# Patient Record
Sex: FEMALE | Race: WHITE | NOT HISPANIC OR LATINO | Employment: FULL TIME | URBAN - METROPOLITAN AREA
[De-identification: names, ages, dates, MRNs, and addresses within clinical notes are randomized per-mention and may not be internally consistent; named-entity substitution may affect disease eponyms.]

---

## 2018-04-10 LAB
CHOLESTEROL, TOTAL: 186 MG/DL
CHOLESTEROL/HDL RATIO: NORMAL
GLUCOSE BLD-MCNC: 97 MG/DL
HDLC SERPL-MCNC: 62 MG/DL (ref 35–70)
LDL CHOLESTEROL CALCULATED: 99 MG/DL (ref 0–160)
NONHDLC SERPL-MCNC: 124 MG/DL
TRIGL SERPL-MCNC: 123 MG/DL
VLDLC SERPL CALC-MCNC: NORMAL MG/DL

## 2019-12-27 ENCOUNTER — APPOINTMENT (OUTPATIENT)
Dept: INTERPRETER SERVICES | Age: 54
End: 2019-12-27

## 2019-12-27 ENCOUNTER — WORKER'S COMP (OUTPATIENT)
Dept: URGENT CARE | Age: 54
End: 2019-12-27

## 2019-12-27 DIAGNOSIS — M62.838 TRAPEZIUS MUSCLE SPASM: ICD-10-CM

## 2019-12-27 DIAGNOSIS — G56.03 BILATERAL CARPAL TUNNEL SYNDROME: Primary | ICD-10-CM

## 2019-12-27 DIAGNOSIS — M62.830 SPASM OF MUSCLE OF LOWER BACK: ICD-10-CM

## 2019-12-27 PROCEDURE — 99203 OFFICE O/P NEW LOW 30 MIN: CPT | Performed by: FAMILY MEDICINE

## 2019-12-27 RX ORDER — CYCLOBENZAPRINE HCL 5 MG
2.5-1 TABLET ORAL 3 TIMES DAILY PRN
Qty: 30 TABLET | Refills: 5 | Status: SHIPPED | OUTPATIENT
Start: 2019-12-27

## 2019-12-27 RX ORDER — PREDNISONE 20 MG/1
TABLET ORAL
Qty: 14 TABLET | Refills: 0 | Status: SHIPPED | OUTPATIENT
Start: 2019-12-27

## 2019-12-27 ASSESSMENT — PAIN SCALES - GENERAL: PAINLEVEL: 7-8

## 2019-12-29 ENCOUNTER — MED INFO FORMS (OUTPATIENT)
Dept: URGENT CARE | Age: 54
End: 2019-12-29

## 2020-01-03 ENCOUNTER — WORKER'S COMP (OUTPATIENT)
Dept: OCCUPATIONAL MEDICINE | Age: 55
End: 2020-01-03
Attending: FAMILY MEDICINE

## 2020-01-03 VITALS
DIASTOLIC BLOOD PRESSURE: 83 MMHG | WEIGHT: 200 LBS | HEIGHT: 65 IN | HEART RATE: 72 BPM | BODY MASS INDEX: 33.32 KG/M2 | SYSTOLIC BLOOD PRESSURE: 121 MMHG

## 2020-01-03 DIAGNOSIS — S39.012A STRAIN OF LUMBAR REGION, INITIAL ENCOUNTER: Primary | ICD-10-CM

## 2020-01-03 DIAGNOSIS — R20.0 BILATERAL HAND NUMBNESS: ICD-10-CM

## 2020-01-03 PROCEDURE — 99214 OFFICE O/P EST MOD 30 MIN: CPT | Performed by: PREVENTIVE MEDICINE

## 2020-01-03 RX ORDER — NAPROXEN 500 MG/1
500 TABLET ORAL 2 TIMES DAILY WITH MEALS
Qty: 30 TABLET | Refills: 0 | Status: SHIPPED | OUTPATIENT
Start: 2020-01-03

## 2020-01-03 ASSESSMENT — ENCOUNTER SYMPTOMS
CHILLS: 0
ACTIVITY CHANGE: 0
COLOR CHANGE: 0
BACK PAIN: 1
BRUISES/BLEEDS EASILY: 0
FEVER: 0
SLEEP DISTURBANCE: 0
NERVOUS/ANXIOUS: 0
NUMBNESS: 1
WEAKNESS: 0

## 2020-01-06 ENCOUNTER — HOSPITAL ENCOUNTER (OUTPATIENT)
Dept: REHABILITATION | Age: 55
Discharge: STILL ADMITTED OR ADMINISTRATIVE | End: 2020-01-06
Attending: PREVENTIVE MEDICINE

## 2020-01-06 DIAGNOSIS — S39.012A STRAIN OF LUMBAR REGION, INITIAL ENCOUNTER: ICD-10-CM

## 2020-01-06 PROCEDURE — 97110 THERAPEUTIC EXERCISES: CPT | Performed by: PHYSICAL THERAPIST

## 2020-01-06 PROCEDURE — 97140 MANUAL THERAPY 1/> REGIONS: CPT | Performed by: PHYSICAL THERAPIST

## 2020-01-06 PROCEDURE — 10004173 HB COUNTER-THERAPY VISIT PT: Performed by: PHYSICAL THERAPIST

## 2020-01-06 PROCEDURE — 97161 PT EVAL LOW COMPLEX 20 MIN: CPT | Performed by: PHYSICAL THERAPIST

## 2020-01-06 ASSESSMENT — ENCOUNTER SYMPTOMS
ALLEVIATING FACTORS: HEAT
QUALITY: ACHE
ALLEVIATING FACTORS: PRESCRIPTION MEDICATIONS
QUALITY: THROBBING
PAIN FREQUENCY: CONSTANT
PAIN SEVERITY NOW: 1
PAIN SCALE AT HIGHEST: 8
ALLEVIATING FACTORS: ICE
SUBJECTIVE PAIN PROGRESSION: IMPROVED
PAIN SCALE AT LOWEST: 1

## 2020-01-08 ENCOUNTER — HOSPITAL ENCOUNTER (OUTPATIENT)
Dept: REHABILITATION | Age: 55
Discharge: STILL ADMITTED OR ADMINISTRATIVE | End: 2020-01-08
Attending: PREVENTIVE MEDICINE

## 2020-01-08 PROCEDURE — 97140 MANUAL THERAPY 1/> REGIONS: CPT | Performed by: PHYSICAL THERAPIST

## 2020-01-08 PROCEDURE — 10004173 HB COUNTER-THERAPY VISIT PT: Performed by: PHYSICAL THERAPIST

## 2020-01-08 PROCEDURE — 97110 THERAPEUTIC EXERCISES: CPT | Performed by: PHYSICAL THERAPIST

## 2020-01-08 ASSESSMENT — ENCOUNTER SYMPTOMS
ALLEVIATING FACTORS: PRESCRIPTION MEDICATIONS
PAIN FREQUENCY: CONSTANT
ALLEVIATING FACTORS: HEAT
PAIN SEVERITY NOW: 4
ALLEVIATING FACTORS: ICE

## 2020-01-13 ENCOUNTER — HOSPITAL ENCOUNTER (OUTPATIENT)
Dept: REHABILITATION | Age: 55
Discharge: STILL ADMITTED OR ADMINISTRATIVE | End: 2020-01-13
Attending: PREVENTIVE MEDICINE

## 2020-01-13 PROCEDURE — 10004173 HB COUNTER-THERAPY VISIT PT: Performed by: PHYSICAL THERAPIST

## 2020-01-13 PROCEDURE — 97110 THERAPEUTIC EXERCISES: CPT | Performed by: PHYSICAL THERAPIST

## 2020-01-13 PROCEDURE — 97140 MANUAL THERAPY 1/> REGIONS: CPT | Performed by: PHYSICAL THERAPIST

## 2020-01-13 ASSESSMENT — ENCOUNTER SYMPTOMS
QUALITY: ACHE
PAIN FREQUENCY: INTERMITTENT
ALLEVIATING FACTORS: HEAT
PAIN SCALE AT HIGHEST: 5
ALLEVIATING FACTORS: ICE
PAIN SCALE AT LOWEST: 2
ALLEVIATING FACTORS: PRESCRIPTION MEDICATIONS
PAIN SEVERITY NOW: 3

## 2020-01-15 ENCOUNTER — HOSPITAL ENCOUNTER (OUTPATIENT)
Dept: REHABILITATION | Age: 55
Discharge: STILL ADMITTED OR ADMINISTRATIVE | End: 2020-01-15
Attending: PREVENTIVE MEDICINE

## 2020-01-15 ENCOUNTER — HOSPITAL ENCOUNTER (OUTPATIENT)
Dept: REHABILITATION | Age: 55
Discharge: STILL ADMITTED OR ADMINISTRATIVE | End: 2020-01-15
Attending: FAMILY MEDICINE

## 2020-01-15 DIAGNOSIS — G56.03 BILATERAL CARPAL TUNNEL SYNDROME: ICD-10-CM

## 2020-01-15 DIAGNOSIS — M62.830 SPASM OF MUSCLE OF LOWER BACK: ICD-10-CM

## 2020-01-15 DIAGNOSIS — M62.838 TRAPEZIUS MUSCLE SPASM: ICD-10-CM

## 2020-01-15 PROCEDURE — 97530 THERAPEUTIC ACTIVITIES: CPT | Performed by: PHYSICAL THERAPIST

## 2020-01-15 PROCEDURE — 10004173 HB COUNTER-THERAPY VISIT PT: Performed by: PHYSICAL THERAPIST

## 2020-01-15 PROCEDURE — 97110 THERAPEUTIC EXERCISES: CPT | Performed by: PHYSICAL THERAPIST

## 2020-01-15 PROCEDURE — 10004172 HB COUNTER-THERAPY VISIT OT: Performed by: OCCUPATIONAL THERAPIST

## 2020-01-15 PROCEDURE — 97140 MANUAL THERAPY 1/> REGIONS: CPT | Performed by: PHYSICAL THERAPIST

## 2020-01-15 PROCEDURE — 97110 THERAPEUTIC EXERCISES: CPT | Performed by: OCCUPATIONAL THERAPIST

## 2020-01-15 PROCEDURE — 97165 OT EVAL LOW COMPLEX 30 MIN: CPT | Performed by: OCCUPATIONAL THERAPIST

## 2020-01-15 ASSESSMENT — ENCOUNTER SYMPTOMS
PAIN FREQUENCY: INTERMITTENT
ALLEVIATING FACTORS: PRESCRIPTION MEDICATIONS
ALLEVIATING FACTORS: HEAT
ALLEVIATING FACTORS: SUPPORTIVE DEVICE
PAIN SCALE AT LOWEST: 0
PAIN FREQUENCY: INTERMITTENT
ALLEVIATING FACTORS: PRESCRIPTION MEDICATIONS
QUALITY: SORE
PAIN SCALE AT HIGHEST: 8
ALLEVIATING FACTORS: OVER-THE-COUNTER MEDICATION
PAIN SCALE AT HIGHEST: 5
ALLEVIATING FACTORS: ICE
ALLEVIATING FACTORS: REST
QUALITY: ACHE
PAIN LOCATION: BILATERAL HANDS AND FINGERS
PAIN SEVERITY NOW: 0
QUALITY: ACHE
PAIN SCALE AT LOWEST: 2
QUALITY: COLD
QUALITY: NUMBNESS
PAIN SEVERITY NOW: 4
SUBJECTIVE PAIN PROGRESSION: IMPROVED

## 2020-01-15 ASSESSMENT — 9 HOLE PEG TEST
TESTTIME_SECONDS: 21
TESTTIME_SECONDS: 18

## 2020-01-17 ENCOUNTER — WORKER'S COMP (OUTPATIENT)
Dept: OCCUPATIONAL MEDICINE | Age: 55
End: 2020-01-17
Attending: FAMILY MEDICINE

## 2020-01-17 VITALS — DIASTOLIC BLOOD PRESSURE: 82 MMHG | SYSTOLIC BLOOD PRESSURE: 122 MMHG | HEART RATE: 72 BPM

## 2020-01-17 DIAGNOSIS — S39.012D STRAIN OF LUMBAR REGION, SUBSEQUENT ENCOUNTER: Primary | ICD-10-CM

## 2020-01-17 PROCEDURE — 99214 OFFICE O/P EST MOD 30 MIN: CPT | Performed by: PREVENTIVE MEDICINE

## 2020-01-17 ASSESSMENT — ENCOUNTER SYMPTOMS
CHILLS: 0
COLOR CHANGE: 0
ACTIVITY CHANGE: 0
BACK PAIN: 1
SLEEP DISTURBANCE: 0
BRUISES/BLEEDS EASILY: 0
FEVER: 0
NERVOUS/ANXIOUS: 0
WEAKNESS: 0
NUMBNESS: 0

## 2020-01-20 ENCOUNTER — HOSPITAL ENCOUNTER (OUTPATIENT)
Dept: REHABILITATION | Age: 55
Discharge: STILL ADMITTED OR ADMINISTRATIVE | End: 2020-01-20
Attending: PREVENTIVE MEDICINE

## 2020-01-20 PROCEDURE — 10004173 HB COUNTER-THERAPY VISIT PT: Performed by: PHYSICAL THERAPIST

## 2020-01-20 PROCEDURE — 97110 THERAPEUTIC EXERCISES: CPT | Performed by: PHYSICAL THERAPIST

## 2020-01-20 PROCEDURE — 97530 THERAPEUTIC ACTIVITIES: CPT | Performed by: PHYSICAL THERAPIST

## 2020-01-20 PROCEDURE — 97140 MANUAL THERAPY 1/> REGIONS: CPT | Performed by: PHYSICAL THERAPIST

## 2020-01-20 ASSESSMENT — ENCOUNTER SYMPTOMS
ALLEVIATING FACTORS: PRESCRIPTION MEDICATIONS
PAIN SCALE AT HIGHEST: 5
ALLEVIATING FACTORS: ICE
PAIN SEVERITY NOW: 2
PAIN SCALE AT LOWEST: 0
PAIN FREQUENCY: INTERMITTENT
ALLEVIATING FACTORS: HEAT
SUBJECTIVE PAIN PROGRESSION: IMPROVED
QUALITY: ACHE

## 2020-01-22 ENCOUNTER — HOSPITAL ENCOUNTER (OUTPATIENT)
Dept: REHABILITATION | Age: 55
Discharge: STILL ADMITTED OR ADMINISTRATIVE | End: 2020-01-22
Attending: PREVENTIVE MEDICINE

## 2020-01-22 ENCOUNTER — APPOINTMENT (OUTPATIENT)
Dept: REHABILITATION | Age: 55
End: 2020-01-22
Attending: FAMILY MEDICINE

## 2020-01-22 PROCEDURE — 97110 THERAPEUTIC EXERCISES: CPT | Performed by: PHYSICAL THERAPIST

## 2020-01-22 PROCEDURE — 97140 MANUAL THERAPY 1/> REGIONS: CPT | Performed by: PHYSICAL THERAPIST

## 2020-01-22 PROCEDURE — 10004173 HB COUNTER-THERAPY VISIT PT: Performed by: PHYSICAL THERAPIST

## 2020-01-22 ASSESSMENT — ENCOUNTER SYMPTOMS
ALLEVIATING FACTORS: ICE
PAIN SEVERITY NOW: 3
PAIN SCALE AT HIGHEST: 5
PAIN SCALE AT LOWEST: 0
ALLEVIATING FACTORS: HEAT
ALLEVIATING FACTORS: PRESCRIPTION MEDICATIONS
PAIN FREQUENCY: INTERMITTENT
QUALITY: ACHE
SUBJECTIVE PAIN PROGRESSION: IMPROVED

## 2020-01-27 ENCOUNTER — HOSPITAL ENCOUNTER (OUTPATIENT)
Dept: REHABILITATION | Age: 55
Discharge: STILL ADMITTED OR ADMINISTRATIVE | End: 2020-01-27
Attending: PREVENTIVE MEDICINE

## 2020-01-27 PROCEDURE — 10004173 HB COUNTER-THERAPY VISIT PT: Performed by: SPECIALIST/TECHNOLOGIST

## 2020-01-27 PROCEDURE — 97140 MANUAL THERAPY 1/> REGIONS: CPT | Performed by: SPECIALIST/TECHNOLOGIST

## 2020-01-27 PROCEDURE — 97110 THERAPEUTIC EXERCISES: CPT | Performed by: SPECIALIST/TECHNOLOGIST

## 2020-01-27 ASSESSMENT — ENCOUNTER SYMPTOMS: PAIN SEVERITY NOW: 4

## 2020-01-31 ENCOUNTER — HOSPITAL ENCOUNTER (OUTPATIENT)
Dept: REHABILITATION | Age: 55
Discharge: STILL ADMITTED OR ADMINISTRATIVE | End: 2020-01-31
Attending: PREVENTIVE MEDICINE

## 2020-01-31 ENCOUNTER — WORKER'S COMP (OUTPATIENT)
Dept: OCCUPATIONAL MEDICINE | Age: 55
End: 2020-01-31
Attending: FAMILY MEDICINE

## 2020-01-31 VITALS — SYSTOLIC BLOOD PRESSURE: 100 MMHG | DIASTOLIC BLOOD PRESSURE: 70 MMHG | HEART RATE: 80 BPM

## 2020-01-31 DIAGNOSIS — S39.012D STRAIN OF LUMBAR REGION, SUBSEQUENT ENCOUNTER: Primary | ICD-10-CM

## 2020-01-31 PROCEDURE — 97112 NEUROMUSCULAR REEDUCATION: CPT | Performed by: SPECIALIST/TECHNOLOGIST

## 2020-01-31 PROCEDURE — 97140 MANUAL THERAPY 1/> REGIONS: CPT | Performed by: SPECIALIST/TECHNOLOGIST

## 2020-01-31 PROCEDURE — 97110 THERAPEUTIC EXERCISES: CPT | Performed by: SPECIALIST/TECHNOLOGIST

## 2020-01-31 PROCEDURE — 99214 OFFICE O/P EST MOD 30 MIN: CPT | Performed by: PREVENTIVE MEDICINE

## 2020-01-31 PROCEDURE — 10004173 HB COUNTER-THERAPY VISIT PT: Performed by: SPECIALIST/TECHNOLOGIST

## 2020-01-31 ASSESSMENT — ENCOUNTER SYMPTOMS
NERVOUS/ANXIOUS: 0
ACTIVITY CHANGE: 0
BACK PAIN: 1
NUMBNESS: 0
FEVER: 0
CHILLS: 0
WEAKNESS: 0
PAIN SEVERITY NOW: 3
BRUISES/BLEEDS EASILY: 0
COLOR CHANGE: 0
SLEEP DISTURBANCE: 0

## 2020-02-03 ENCOUNTER — HOSPITAL ENCOUNTER (OUTPATIENT)
Dept: REHABILITATION | Age: 55
Discharge: STILL ADMITTED OR ADMINISTRATIVE | End: 2020-02-03
Attending: PREVENTIVE MEDICINE

## 2020-02-03 PROCEDURE — 97110 THERAPEUTIC EXERCISES: CPT | Performed by: SPECIALIST/TECHNOLOGIST

## 2020-02-03 PROCEDURE — 10004173 HB COUNTER-THERAPY VISIT PT: Performed by: SPECIALIST/TECHNOLOGIST

## 2020-02-03 PROCEDURE — 97112 NEUROMUSCULAR REEDUCATION: CPT | Performed by: SPECIALIST/TECHNOLOGIST

## 2020-02-07 ENCOUNTER — HOSPITAL ENCOUNTER (OUTPATIENT)
Dept: REHABILITATION | Age: 55
Discharge: STILL ADMITTED OR ADMINISTRATIVE | End: 2020-02-07
Attending: PREVENTIVE MEDICINE

## 2020-02-07 PROCEDURE — 97140 MANUAL THERAPY 1/> REGIONS: CPT | Performed by: SPECIALIST/TECHNOLOGIST

## 2020-02-07 PROCEDURE — 97110 THERAPEUTIC EXERCISES: CPT | Performed by: SPECIALIST/TECHNOLOGIST

## 2020-02-07 PROCEDURE — 97530 THERAPEUTIC ACTIVITIES: CPT | Performed by: SPECIALIST/TECHNOLOGIST

## 2020-02-07 PROCEDURE — 10004173 HB COUNTER-THERAPY VISIT PT: Performed by: SPECIALIST/TECHNOLOGIST

## 2020-02-07 ASSESSMENT — ENCOUNTER SYMPTOMS: PAIN SEVERITY NOW: 4

## 2020-02-10 ENCOUNTER — HOSPITAL ENCOUNTER (OUTPATIENT)
Dept: REHABILITATION | Age: 55
Discharge: STILL ADMITTED OR ADMINISTRATIVE | End: 2020-02-10
Attending: PREVENTIVE MEDICINE

## 2020-02-10 PROCEDURE — 97140 MANUAL THERAPY 1/> REGIONS: CPT | Performed by: SPECIALIST/TECHNOLOGIST

## 2020-02-10 PROCEDURE — 97530 THERAPEUTIC ACTIVITIES: CPT | Performed by: SPECIALIST/TECHNOLOGIST

## 2020-02-10 PROCEDURE — 97110 THERAPEUTIC EXERCISES: CPT | Performed by: SPECIALIST/TECHNOLOGIST

## 2020-02-10 PROCEDURE — 10004173 HB COUNTER-THERAPY VISIT PT: Performed by: SPECIALIST/TECHNOLOGIST

## 2020-02-14 ENCOUNTER — WORKER'S COMP (OUTPATIENT)
Dept: OCCUPATIONAL MEDICINE | Age: 55
End: 2020-02-14
Attending: FAMILY MEDICINE

## 2020-02-14 ENCOUNTER — APPOINTMENT (OUTPATIENT)
Dept: INTERPRETER SERVICES | Age: 55
End: 2020-02-14

## 2020-02-14 ENCOUNTER — HOSPITAL ENCOUNTER (OUTPATIENT)
Dept: REHABILITATION | Age: 55
Discharge: STILL ADMITTED OR ADMINISTRATIVE | End: 2020-02-14
Attending: PREVENTIVE MEDICINE

## 2020-02-14 VITALS — DIASTOLIC BLOOD PRESSURE: 70 MMHG | SYSTOLIC BLOOD PRESSURE: 108 MMHG | HEART RATE: 64 BPM

## 2020-02-14 DIAGNOSIS — S39.012D STRAIN OF LUMBAR REGION, SUBSEQUENT ENCOUNTER: Primary | ICD-10-CM

## 2020-02-14 PROCEDURE — 97110 THERAPEUTIC EXERCISES: CPT | Performed by: PHYSICAL THERAPIST

## 2020-02-14 PROCEDURE — 10004173 HB COUNTER-THERAPY VISIT PT: Performed by: PHYSICAL THERAPIST

## 2020-02-14 PROCEDURE — 97530 THERAPEUTIC ACTIVITIES: CPT | Performed by: PHYSICAL THERAPIST

## 2020-02-14 PROCEDURE — 97140 MANUAL THERAPY 1/> REGIONS: CPT | Performed by: PHYSICAL THERAPIST

## 2020-02-14 PROCEDURE — 99213 OFFICE O/P EST LOW 20 MIN: CPT | Performed by: PREVENTIVE MEDICINE

## 2020-02-14 ASSESSMENT — ENCOUNTER SYMPTOMS
BACK PAIN: 0
NERVOUS/ANXIOUS: 0
BRUISES/BLEEDS EASILY: 0
ACTIVITY CHANGE: 0
COLOR CHANGE: 0
FEVER: 0
SLEEP DISTURBANCE: 0
PAIN SEVERITY NOW: 0
NUMBNESS: 0
CHILLS: 0
WEAKNESS: 0

## 2020-02-18 ENCOUNTER — HOSPITAL ENCOUNTER (OUTPATIENT)
Dept: REHABILITATION | Age: 55
Discharge: STILL ADMITTED OR ADMINISTRATIVE | End: 2020-02-18
Attending: PREVENTIVE MEDICINE

## 2020-02-18 PROCEDURE — 97530 THERAPEUTIC ACTIVITIES: CPT | Performed by: SPECIALIST/TECHNOLOGIST

## 2020-02-18 PROCEDURE — 97110 THERAPEUTIC EXERCISES: CPT | Performed by: SPECIALIST/TECHNOLOGIST

## 2020-02-18 PROCEDURE — 97140 MANUAL THERAPY 1/> REGIONS: CPT | Performed by: SPECIALIST/TECHNOLOGIST

## 2020-02-18 PROCEDURE — 10004173 HB COUNTER-THERAPY VISIT PT: Performed by: SPECIALIST/TECHNOLOGIST

## 2020-02-20 ENCOUNTER — HOSPITAL ENCOUNTER (OUTPATIENT)
Dept: REHABILITATION | Age: 55
Discharge: STILL ADMITTED OR ADMINISTRATIVE | End: 2020-02-20
Attending: PREVENTIVE MEDICINE

## 2020-02-20 PROCEDURE — 97110 THERAPEUTIC EXERCISES: CPT | Performed by: SPECIALIST/TECHNOLOGIST

## 2020-02-20 PROCEDURE — 97530 THERAPEUTIC ACTIVITIES: CPT | Performed by: SPECIALIST/TECHNOLOGIST

## 2020-02-20 PROCEDURE — 97140 MANUAL THERAPY 1/> REGIONS: CPT | Performed by: SPECIALIST/TECHNOLOGIST

## 2020-02-20 PROCEDURE — 10004173 HB COUNTER-THERAPY VISIT PT: Performed by: SPECIALIST/TECHNOLOGIST

## 2020-02-27 ENCOUNTER — HOSPITAL ENCOUNTER (OUTPATIENT)
Dept: REHABILITATION | Age: 55
Discharge: STILL ADMITTED OR ADMINISTRATIVE | End: 2020-02-27
Attending: PREVENTIVE MEDICINE

## 2020-02-27 PROCEDURE — 97530 THERAPEUTIC ACTIVITIES: CPT | Performed by: SPECIALIST/TECHNOLOGIST

## 2020-02-27 PROCEDURE — 97140 MANUAL THERAPY 1/> REGIONS: CPT | Performed by: SPECIALIST/TECHNOLOGIST

## 2020-02-27 PROCEDURE — 10004173 HB COUNTER-THERAPY VISIT PT: Performed by: SPECIALIST/TECHNOLOGIST

## 2020-02-27 PROCEDURE — 97110 THERAPEUTIC EXERCISES: CPT | Performed by: SPECIALIST/TECHNOLOGIST

## 2020-02-27 ASSESSMENT — ENCOUNTER SYMPTOMS
SUBJECTIVE PAIN PROGRESSION: RESOLVED
PAIN SEVERITY NOW: 0

## 2020-06-17 ENCOUNTER — TELEPHONE (OUTPATIENT)
Dept: SCHEDULING | Age: 55
End: 2020-06-17

## 2021-02-03 ENCOUNTER — TELEPHONE (OUTPATIENT)
Dept: FAMILY MEDICINE CLINIC | Age: 56
End: 2021-02-03

## 2021-05-21 ENCOUNTER — VIRTUAL VISIT (OUTPATIENT)
Dept: FAMILY MEDICINE CLINIC | Age: 56
End: 2021-05-21
Payer: COMMERCIAL

## 2021-05-21 DIAGNOSIS — Z12.31 ENCOUNTER FOR SCREENING MAMMOGRAM FOR MALIGNANT NEOPLASM OF BREAST: ICD-10-CM

## 2021-05-21 DIAGNOSIS — Z00.00 ANNUAL PHYSICAL EXAM: ICD-10-CM

## 2021-05-21 DIAGNOSIS — M54.50 CHRONIC BILATERAL LOW BACK PAIN, UNSPECIFIED WHETHER SCIATICA PRESENT: ICD-10-CM

## 2021-05-21 DIAGNOSIS — C76.2 CANCER OF ABDOMEN (HCC): ICD-10-CM

## 2021-05-21 DIAGNOSIS — Z76.89 ENCOUNTER TO ESTABLISH CARE: Primary | ICD-10-CM

## 2021-05-21 DIAGNOSIS — G89.29 CHRONIC BILATERAL LOW BACK PAIN, UNSPECIFIED WHETHER SCIATICA PRESENT: ICD-10-CM

## 2021-05-21 DIAGNOSIS — Z12.11 SCREENING FOR COLON CANCER: ICD-10-CM

## 2021-05-21 PROCEDURE — 99203 OFFICE O/P NEW LOW 30 MIN: CPT | Performed by: FAMILY MEDICINE

## 2021-05-21 RX ORDER — UBIDECARENONE 75 MG
50 CAPSULE ORAL DAILY
COMMUNITY

## 2021-05-21 RX ORDER — MULTIVIT WITH MINERALS/LUTEIN
250 TABLET ORAL DAILY
COMMUNITY

## 2021-05-21 RX ORDER — CALCIUM CARBONATE 500(1250)
500 TABLET ORAL DAILY
COMMUNITY

## 2021-05-21 SDOH — ECONOMIC STABILITY: TRANSPORTATION INSECURITY
IN THE PAST 12 MONTHS, HAS LACK OF TRANSPORTATION KEPT YOU FROM MEETINGS, WORK, OR FROM GETTING THINGS NEEDED FOR DAILY LIVING?: NO

## 2021-05-21 SDOH — ECONOMIC STABILITY: FOOD INSECURITY: WITHIN THE PAST 12 MONTHS, THE FOOD YOU BOUGHT JUST DIDN'T LAST AND YOU DIDN'T HAVE MONEY TO GET MORE.: NEVER TRUE

## 2021-05-21 SDOH — ECONOMIC STABILITY: FOOD INSECURITY: WITHIN THE PAST 12 MONTHS, YOU WORRIED THAT YOUR FOOD WOULD RUN OUT BEFORE YOU GOT MONEY TO BUY MORE.: NEVER TRUE

## 2021-05-21 ASSESSMENT — ENCOUNTER SYMPTOMS
ABDOMINAL PAIN: 0
COLOR CHANGE: 0
BACK PAIN: 0
VOMITING: 0
SHORTNESS OF BREATH: 0
NAUSEA: 0

## 2021-05-21 ASSESSMENT — PATIENT HEALTH QUESTIONNAIRE - PHQ9
SUM OF ALL RESPONSES TO PHQ QUESTIONS 1-9: 0
SUM OF ALL RESPONSES TO PHQ QUESTIONS 1-9: 0
1. LITTLE INTEREST OR PLEASURE IN DOING THINGS: 0
2. FEELING DOWN, DEPRESSED OR HOPELESS: 0

## 2021-05-21 NOTE — PROGRESS NOTES
2021    TELEHEALTH EVALUATION -- Audio/Visual (During [facility] public health emergency)    HPI:    Florentino Person (:  1965) has requested an audio/video evaluation for the following concern(s): Interested in a physical  Denies any medical Hx  PSHx: 3 Csections, 1      Surgery near the pancreas, 2012, \"they found a mass\"  They removed it and \"said they found it in time\"  [facility]     Had a colonoscopy in , \"normal\"  ?last pap smear  Labs were last performed 2 years ago  Needs a mammogram    Back pain:   Assumes it's from work       Review of Systems   Constitutional: Negative for activity change, chills, diaphoresis, fatigue, fever and unexpected weight change. Respiratory: Negative for shortness of breath. Cardiovascular: Negative for chest pain and leg swelling. Gastrointestinal: Negative for abdominal pain, nausea and vomiting. Genitourinary: Negative for difficulty urinating. Musculoskeletal: Positive for arthralgias. Negative for back pain, gait problem, joint swelling, myalgias, neck pain and neck stiffness. Skin: Negative for color change, pallor, rash and wound. Neurological: Negative for weakness, numbness and headaches. Psychiatric/Behavioral: Negative for dysphoric mood and sleep disturbance. The patient is not nervous/anxious. Prior to Visit Medications    Medication Sig Taking?  Authorizing Provider   calcium carbonate (OSCAL) 500 MG TABS tablet Take 500 mg by mouth daily Yes Historical Provider, MD   Ascorbic Acid (VITAMIN C) 250 MG tablet Take 250 mg by mouth daily Yes Historical Provider, MD   vitamin B-12 (CYANOCOBALAMIN) 100 MCG tablet Take 50 mcg by mouth daily Yes Historical Provider, MD   diclofenac sodium (VOLTAREN) 1 % GEL Apply 2-4 g topically 2 times daily Yes Christi Robles MD       Social History     Tobacco Use    Smoking status: Never Smoker    Smokeless tobacco: Never Used   Substance Use Topics    Alcohol use: Yes     Comment: socially    Drug use: Never        Allergies   Allergen Reactions    Hydrocodone Nausea And Vomiting   , No past medical history on file.,   Past Surgical History:   Procedure Laterality Date    ABDOMEN SURGERY N/A     , \"cancer near the pancreas\"     SECTION      x3    COLONOSCOPY  2012    DILATION AND CURETTAGE OF UTERUS     ,   Social History     Tobacco Use    Smoking status: Never Smoker    Smokeless tobacco: Never Used   Substance Use Topics    Alcohol use: Yes     Comment: socially    Drug use: Never   , No family history on file.,   There is no immunization history on file for this patient.,   Health Maintenance   Topic Date Due    Hepatitis C screen  Never done    COVID-19 Vaccine (1) Never done    HIV screen  Never done    DTaP/Tdap/Td vaccine (1 - Tdap) Never done    Cervical cancer screen  Never done    Lipid screen  Never done    Breast cancer screen  Never done    Shingles Vaccine (1 of 2) Never done    Colon cancer screen colonoscopy  Never done    Flu vaccine (Season Ended) 2021    Hepatitis A vaccine  Aged Out    Hepatitis B vaccine  Aged Out    Hib vaccine  Aged Out    Meningococcal (ACWY) vaccine  Aged Out    Pneumococcal 0-64 years Vaccine  Aged Out       PHYSICAL EXAMINATION:  [ INSTRUCTIONS:  \"[x]\" Indicates a positive item  \"[]\" Indicates a negative item  -- DELETE ALL ITEMS NOT EXAMINED]  Vital Signs: (As obtained by patient/caregiver or practitioner observation)    Blood pressure-  Heart rate-    Respiratory rate-    Temperature-  Pulse oximetry-     Constitutional: [x] Appears well-developed and well-nourished [x] No apparent distress      [] Abnormal-   Mental status  [x] Alert and awake  [] Oriented to person/place/time [x]Able to follow commands      Eyes:  EOM    [x]  Normal  [] Abnormal-  Sclera  [x]  Normal  [] Abnormal -         Discharge []  None visible [] Abnormal -    HENT:   [x] Normocephalic, atraumatic. [x] Abnormal   [] Mouth/Throat: Mucous membranes are moist.     External Ears [x] Normal  [] Abnormal-     Neck: [x] No visualized mass     Pulmonary/Chest: [x] Respiratory effort normal.  [x] No visualized signs of difficulty breathing or respiratory distress        [] Abnormal-      Musculoskeletal:   [] Normal gait with no signs of ataxia         [x] Normal range of motion of neck        [] Abnormal-       Neurological:        [x] No Facial Asymmetry (Cranial nerve 7 motor function) (limited exam to video visit)          [] No gaze palsy        [] Abnormal-         Skin:        [x] No significant exanthematous lesions or discoloration noted on facial skin         [] Abnormal-            Psychiatric:       [x] Normal Affect [] No Hallucinations        [] Abnormal-     Other pertinent observable physical exam findings-     ASSESSMENT/PLAN:  1. Encounter to establish care    2. Cancer of abdomen [facility])  Per pt report, hx of \"mass removed near her pancreas\" in 2012. Was followed for 1 year, and \"was told it was fine. \" Will try to obtain records from [place], [place]. - MRI ABDOMEN WO CONTRAST; Future    3. Chronic bilateral low back pain, unspecified whether sciatica present  Daily heat/exercises/ice regiment discussed in detail, h/o's given   Topical medication prescribed per below  - diclofenac sodium (VOLTAREN) 1 % GEL; Apply 2-4 g topically 2 times daily  Dispense: 100 g; Refill: 1    4. Encounter for screening mammogram for malignant neoplasm of breast  - [facility] DIGITAL SCREEN W OR WO CAD BILATERAL; Future    5. Annual physical exam  Return for pap smear  Will update us with her vaccine records  - [facility] DIGITAL SCREEN W OR WO CAD BILATERAL; Future  - Clara Marquez MD, Gastroenterology, [facility]  - Comprehensive Metabolic Panel; Future  - CBC Auto Differential; Future  - Hemoglobin A1C; Future  - HIV Screen;  Future  - Hepatitis C Antibody;

## 2021-05-24 ENCOUNTER — TELEPHONE (OUTPATIENT)
Dept: GASTROENTEROLOGY | Age: 56
End: 2021-05-24

## 2021-05-25 DIAGNOSIS — Z12.11 COLON CANCER SCREENING: Primary | ICD-10-CM

## 2021-05-25 RX ORDER — POLYETHYLENE GLYCOL 3350 17 G/17G
POWDER, FOR SOLUTION ORAL
Qty: 238 G | Refills: 1 | Status: ON HOLD
Start: 2021-05-25 | End: 2021-08-26 | Stop reason: HOSPADM

## 2021-05-25 RX ORDER — BISACODYL 5 MG
TABLET, DELAYED RELEASE (ENTERIC COATED) ORAL
Qty: 4 TABLET | Refills: 0 | Status: ON HOLD
Start: 2021-05-25 | End: 2021-08-26 | Stop reason: HOSPADM

## 2021-05-26 VITALS
TEMPERATURE: 98.3 F | OXYGEN SATURATION: 98 % | SYSTOLIC BLOOD PRESSURE: 109 MMHG | WEIGHT: 195 LBS | HEART RATE: 66 BPM | DIASTOLIC BLOOD PRESSURE: 75 MMHG

## 2021-07-07 ENCOUNTER — TELEPHONE (OUTPATIENT)
Dept: SURGERY | Age: 56
End: 2021-07-07

## 2021-07-12 ENCOUNTER — TELEPHONE (OUTPATIENT)
Dept: GASTROENTEROLOGY | Age: 56
End: 2021-07-12

## 2021-07-30 DIAGNOSIS — Z00.00 ANNUAL PHYSICAL EXAM: ICD-10-CM

## 2021-07-30 LAB
A/G RATIO: 1.7 (ref 1.1–2.2)
ALBUMIN SERPL-MCNC: 4.2 G/DL (ref 3.4–5)
ALP BLD-CCNC: 77 U/L (ref 40–129)
ALT SERPL-CCNC: 17 U/L (ref 10–40)
ANION GAP SERPL CALCULATED.3IONS-SCNC: 9 MMOL/L (ref 3–16)
AST SERPL-CCNC: 19 U/L (ref 15–37)
BASOPHILS ABSOLUTE: 0 K/UL (ref 0–0.2)
BASOPHILS RELATIVE PERCENT: 1.3 %
BILIRUB SERPL-MCNC: 0.5 MG/DL (ref 0–1)
BUN BLDV-MCNC: 15 MG/DL (ref 7–20)
CALCIUM SERPL-MCNC: 9.3 MG/DL (ref 8.3–10.6)
CHLORIDE BLD-SCNC: 103 MMOL/L (ref 99–110)
CHOLESTEROL, TOTAL: 184 MG/DL (ref 0–199)
CO2: 26 MMOL/L (ref 21–32)
CREAT SERPL-MCNC: 0.6 MG/DL (ref 0.6–1.1)
EOSINOPHILS ABSOLUTE: 0.2 K/UL (ref 0–0.6)
EOSINOPHILS RELATIVE PERCENT: 6.5 %
GFR AFRICAN AMERICAN: >60
GFR NON-AFRICAN AMERICAN: >60
GLOBULIN: 2.5 G/DL
GLUCOSE BLD-MCNC: 101 MG/DL (ref 70–99)
HCT VFR BLD CALC: 38.7 % (ref 36–48)
HDLC SERPL-MCNC: 83 MG/DL (ref 40–60)
HEMOGLOBIN: 13.3 G/DL (ref 12–16)
HEPATITIS C ANTIBODY INTERPRETATION: NORMAL
LDL CHOLESTEROL CALCULATED: 89 MG/DL
LIPASE: 142 U/L (ref 13–60)
LYMPHOCYTES ABSOLUTE: 1.3 K/UL (ref 1–5.1)
LYMPHOCYTES RELATIVE PERCENT: 36 %
MCH RBC QN AUTO: 31.9 PG (ref 26–34)
MCHC RBC AUTO-ENTMCNC: 34.3 G/DL (ref 31–36)
MCV RBC AUTO: 92.9 FL (ref 80–100)
MONOCYTES ABSOLUTE: 0.3 K/UL (ref 0–1.3)
MONOCYTES RELATIVE PERCENT: 8.1 %
NEUTROPHILS ABSOLUTE: 1.7 K/UL (ref 1.7–7.7)
NEUTROPHILS RELATIVE PERCENT: 48.1 %
PDW BLD-RTO: 14.4 % (ref 12.4–15.4)
PLATELET # BLD: 309 K/UL (ref 135–450)
PMV BLD AUTO: 7.6 FL (ref 5–10.5)
POTASSIUM SERPL-SCNC: 4.5 MMOL/L (ref 3.5–5.1)
RBC # BLD: 4.16 M/UL (ref 4–5.2)
SODIUM BLD-SCNC: 138 MMOL/L (ref 136–145)
TOTAL PROTEIN: 6.7 G/DL (ref 6.4–8.2)
TRIGL SERPL-MCNC: 58 MG/DL (ref 0–150)
TSH REFLEX: 1.21 UIU/ML (ref 0.27–4.2)
VITAMIN D 25-HYDROXY: 27.9 NG/ML
VLDLC SERPL CALC-MCNC: 12 MG/DL
WBC # BLD: 3.6 K/UL (ref 4–11)

## 2021-07-31 LAB
ESTIMATED AVERAGE GLUCOSE: 116.9 MG/DL
HBA1C MFR BLD: 5.7 %
HIV AG/AB: NORMAL
HIV ANTIGEN: NORMAL
HIV-1 ANTIBODY: NORMAL
HIV-2 AB: NORMAL

## 2021-08-06 ENCOUNTER — OFFICE VISIT (OUTPATIENT)
Dept: FAMILY MEDICINE CLINIC | Age: 56
End: 2021-08-06
Payer: COMMERCIAL

## 2021-08-06 VITALS
SYSTOLIC BLOOD PRESSURE: 110 MMHG | WEIGHT: 196 LBS | OXYGEN SATURATION: 99 % | HEART RATE: 58 BPM | BODY MASS INDEX: 34.73 KG/M2 | HEIGHT: 63 IN | DIASTOLIC BLOOD PRESSURE: 70 MMHG

## 2021-08-06 DIAGNOSIS — Z12.11 COLON CANCER SCREENING: ICD-10-CM

## 2021-08-06 DIAGNOSIS — R73.03 PREDIABETES: ICD-10-CM

## 2021-08-06 DIAGNOSIS — K86.89 PANCREATIC MASS: ICD-10-CM

## 2021-08-06 DIAGNOSIS — Z11.51 ENCOUNTER FOR SCREENING FOR HUMAN PAPILLOMAVIRUS (HPV): ICD-10-CM

## 2021-08-06 DIAGNOSIS — R74.8 ELEVATED LIPASE: ICD-10-CM

## 2021-08-06 DIAGNOSIS — E55.9 VITAMIN D DEFICIENCY: ICD-10-CM

## 2021-08-06 DIAGNOSIS — Z12.4 CERVICAL CANCER SCREENING: Primary | ICD-10-CM

## 2021-08-06 RX ORDER — ACETAMINOPHEN 160 MG
1 TABLET,DISINTEGRATING ORAL DAILY
Qty: 90 CAPSULE | Refills: 3 | Status: SHIPPED | OUTPATIENT
Start: 2021-08-06 | End: 2022-08-01

## 2021-08-06 ASSESSMENT — ENCOUNTER SYMPTOMS
CONSTIPATION: 0
SHORTNESS OF BREATH: 0
COLOR CHANGE: 0
VOMITING: 0
ABDOMINAL DISTENTION: 0
NAUSEA: 0
BACK PAIN: 0
DIARRHEA: 0
ABDOMINAL PAIN: 0

## 2021-08-06 NOTE — PROGRESS NOTES
2021    This is a 48 y.o. female who presents for  Chief Complaint   Patient presents with    Gynecologic Exam       HPI:     No LMP recorded. Denies any vaginal discharge, new pelvic pain, dyspareunia, AUB  No new sexual partners,    Defers any STD testing    Breast exam:   Denies any new lumps or bumps, skin changes, nipple drainage, new tenderness  Family history updated  UTD mammogram, reportedly normal in  in [place]     No past medical history on file. Past Surgical History:   Procedure Laterality Date    ABDOMEN SURGERY N/A     , \"cancer near the pancreas\"     SECTION      x3    COLONOSCOPY  2012    DILATION AND CURETTAGE OF UTERUS         Social History     Socioeconomic History    Marital status:      Spouse name: Not on file    Number of children: Not on file    Years of education: Not on file    Highest education level: Not on file   Occupational History    Not on file   Tobacco Use    Smoking status: Never Smoker    Smokeless tobacco: Never Used   Substance and Sexual Activity    Alcohol use: Yes     Comment: socially    Drug use: Never    Sexual activity: Not on file   Other Topics Concern    Not on file   Social History Narrative    Not on file     Social Determinants of Health     Financial Resource Strain: Low Risk     Difficulty of Paying Living Expenses: Not hard at all   Food Insecurity: No Food Insecurity    Worried About Running Out of Food in the Last Year: Never true    [address] in the Last Year: Never true   Transportation Needs: No Transportation Needs    Lack of Transportation (Medical): No    Lack of Transportation (Non-Medical):  No   Physical Activity:     Days of Exercise per Week:     Minutes of Exercise per Session:    Stress:     Feeling of Stress :    Social Connections:     Frequency of Communication with Friends and Family:     Frequency of Social Gatherings with Friends and Family:     Attends Faith Services:     Active Member of Clubs or Organizations:     Attends Club or Organization Meetings:     Marital Status:    Intimate Partner Violence:     Fear of Current or Ex-Partner:     Emotionally Abused:     Physically Abused:     Sexually Abused:        No family history on file. Current Outpatient Medications   Medication Sig Dispense Refill    Cholecalciferol (VITAMIN D3) 50 MCG (2000 UT) CAPS Take 1 capsule by mouth daily 90 capsule 3    calcium carbonate (OSCAL) 500 MG TABS tablet Take 500 mg by mouth daily      Ascorbic Acid (VITAMIN C) 250 MG tablet Take 250 mg by mouth daily      vitamin B-12 (CYANOCOBALAMIN) 100 MCG tablet Take 50 mcg by mouth daily      diclofenac sodium (VOLTAREN) 1 % GEL Apply 2-4 g topically 2 times daily 100 g 1    bisacodyl (BISACODYL) 5 MG EC tablet Use as directed for colonoscopy prep 4 tablet 0    polyethylene glycol (GLYCOLAX) 17 GM/SCOOP powder Use as directed for colonoscopy prep 238 g 1     No current facility-administered medications for this visit. There is no immunization history on file for this patient. Allergies   Allergen Reactions    Hydrocodone Nausea And Vomiting       Review of Systems   Constitutional: Negative for activity change, chills, fever and unexpected weight change. Respiratory: Negative for shortness of breath. Cardiovascular: Negative for chest pain. Gastrointestinal: Negative for abdominal distention, abdominal pain, constipation, diarrhea, nausea and vomiting. Genitourinary: Negative for decreased urine volume, difficulty urinating, dyspareunia, dysuria, flank pain, frequency, genital sores, hematuria, menstrual problem, pelvic pain, urgency, vaginal bleeding, vaginal discharge and vaginal pain. Musculoskeletal: Negative for back pain. Skin: Negative for color change and rash. Allergic/Immunologic: Negative for immunocompromised state. Hematological: Negative for adenopathy.        /70 (Site: Right Upper Arm, Position: Sitting, Cuff Size: Medium Adult)   Pulse 58   Ht 5' 2.5\" (1.588 m)   Wt 196 lb (88.9 kg)   SpO2 99%   BMI 35.28 kg/m²     Physical Exam  Vitals and nursing note reviewed. Constitutional:       General: She is not in acute distress. Appearance: She is well-developed. She is not diaphoretic. HENT:      Head: Normocephalic and atraumatic. Eyes:      Pupils: Pupils are equal, round, and reactive to light. Cardiovascular:      Rate and Rhythm: Normal rate and regular rhythm. Pulmonary:      Effort: Pulmonary effort is normal. No respiratory distress. Breath sounds: Normal breath sounds. Abdominal:      General: There is no distension. Palpations: Abdomen is soft. There is no mass. Tenderness: There is no abdominal tenderness. There is no guarding or rebound. Genitourinary:     Labia:         Right: No rash, tenderness, lesion or injury. Left: No rash, tenderness, lesion or injury. Vagina: Normal. No signs of injury and foreign body. No vaginal discharge, erythema, tenderness or bleeding. Cervix: No cervical motion tenderness, discharge or friability. Uterus: Not deviated, not enlarged, not fixed and not tender. Adnexa:         Right: No mass, tenderness or fullness. Left: No mass, tenderness or fullness. Rectum: No external hemorrhoid. Musculoskeletal:         General: Normal range of motion. Cervical back: Normal range of motion and neck supple. Skin:     General: Skin is warm and dry. Coloration: Skin is not pale. Findings: No erythema or rash. Neurological:      Mental Status: She is alert. Psychiatric:         Behavior: Behavior normal.         Thought Content: Thought content normal.         Judgment: Judgment normal.         Plan  1. Cervical cancer screening  - PAP SMEAR  - HUMAN PAPILLOMAVIRUS (HPV) DNA PROBE THIN PREP HIGH RISK    2.  Encounter for screening for human papillomavirus (HPV)  - HUMAN PAPILLOMAVIRUS (HPV) DNA PROBE THIN PREP HIGH RISK    3. Vitamin D deficiency  Supplementation sent to pharmacy on file     4. Prediabetes  Discussed diagnosis in detail and provided H/o in English    5. Elevated lipase  6. Pancreatic mass  MRI abd previously ordered  Hx of pancreatic mass found in [place], hx of surgical removal.   Pt was scheduled for her MRI on 8/12 at 9 AM. Scheduling information relayed in detail via      7. Colon cancer screening  Has her colonoscopy scheduled on 8/26, logistics for COVID19 testing relayed to pt and        Mammogram due in 12/21    While assessing care for this patient, I have reviewed all pertinent lab work/imaging/ specialist notes and care in reference to those problems addressed above in detail. Appropriate medical decision making was based on this. Please note that portions of this note may have been completed with a voice recognition program. Efforts were made to edit the dictations but occasionally words are mis-transcribed. Return if symptoms worsen or fail to improve.

## 2021-08-06 NOTE — PATIENT INSTRUCTIONS
Patient Education        Learning About Cervical Cancer Screening  What is a cervical cancer screening test?     The cervix is the lower part of the uterus that opens into the vagina. Cervical cancer screening tests check the cells on the cervix for changes that could lead to cancer. Two tests can be used to screen for cervical cancer. They may be used alone or together. A Pap test.   This test looks for changes in the cells of the cervix. Some of these cell changes could lead to cancer. A human papillomavirus (HPV) test.   This test looks for the HPV virus. Some high-risk types of HPV can cause cell changes that could lead to cervical cancer. When should you have a screening test?  If you have a cervix, you may need cervical cancer screening. Screening will depend on many things. Ages 24 to 34  Screening options for these ages include:  · A Pap test. If your results are normal, you can wait 3 years to have another test.  · An HPV test beginning at age 22. If your results are negative, you can wait 5 years to have another test.  Ages 27 to 59  Screening options for these ages include:  · A Pap test. If your results are normal, you can wait 3 years to have another test.  · An HPV test. If your results are negative, you can wait 5 years to have another test.  · A Pap test and an HPV test. If your results are normal, you can wait 5 years to be tested again. Ages 72 and older  If you are age 72 or older and you've always had normal screening results, you may not need screening. Talk to your doctor. What do the results of cervical cancer screening mean? Your test results may be normal. Or the results may show minor or serious changes to the cells on your cervix. Minor changes may go away on their own, especially if you are younger than 30.   You may have an abnormal test because you have an infection of the vagina or cervix or because you have low estrogen levels after menopause that are causing the cells to change. If you have a high-risk type of human papillomavirus (HPV) or cell changes that could turn into cancer, you may need more tests. Your doctor may suggest that you wait to be retested. Or you may need to have a colposcopy or treatment right away. Your doctor will recommend a follow-up plan based on your results and your age. Follow-up care is a key part of your treatment and safety. Be sure to make and go to all appointments, and call your doctor if you are having problems. It's also a good idea to know your test results and keep a list of the medicines you take. Where can you learn more? Go to https://STYLIGHTpepiceweb.[employer] org and sign in to your [employer] account. Enter P919 in the Mindbloom box to learn more about \"Learning About Cervical Cancer Screening. \"     If you do not have an account, please click on the \"Sign Up Now\" link. Current as of: December 17, 2020               Content Version: 12.9  © 2006-2021 [employer]. Care instructions adapted under license by [facility] ([facility]). If you have questions about a medical condition or this instruction, always ask your healthcare professional. [place] [address] any warranty or liability for your use of this information. Patient Education        Prediabetes: Care Instructions  Overview     Prediabetes is a warning sign that you're at risk for getting type 2 diabetes. It means that your blood sugar is higher than it should be. But it's not high enough to be diabetes. The food you eat naturally turns into sugar. Your body uses the sugar for energy. Normally, an organ called the pancreas makes insulin. And insulin allows the sugar in your blood to get into your body's cells. But sometimes the body can't use insulin the right way. So the sugar stays in your blood instead. This is called insulin resistance. The buildup of sugar in your blood means you have prediabetes.   The good news is that you may be able to prevent or delay diabetes. Making small lifestyle changes, like getting active and changing your eating habits, may help you get your blood sugar back to normal. You can work with your doctor to make a treatment plan. Follow-up care is a key part of your treatment and safety. Be sure to make and go to all appointments, and call your doctor if you are having problems. It's also a good idea to know your test results and keep a list of the medicines you take. How can you care for yourself at home? · Watch your weight. A healthy weight helps your body use insulin properly. · Limit the amount of calories, sweets, and unhealthy fat you eat. Ask your doctor if you should see a dietitian. A registered dietitian can help you create meal plans that fit your lifestyle. · Get at least 30 minutes of exercise on most days of the week. Exercise helps control your blood sugar. It also helps you maintain a healthy weight. Walking is a good choice. You also may want to do other activities, such as running, swimming, cycling, or playing tennis or team sports. · Do not smoke. Smoking can make prediabetes worse. If you need help quitting, talk to your doctor about stop-smoking programs and medicines. These can increase your chances of quitting for good. · If your doctor prescribed medicines, take them exactly as prescribed. Call your doctor if you think you are having a problem with your medicine. You will get more details on the specific medicines your doctor prescribes. When should you call for help? Watch closely for changes in your health, and be sure to contact your doctor if:    · You have any symptoms of diabetes. These may include:  ? Being thirsty more often. ? Urinating more. ? Being hungrier. ? Losing weight. ? Being very tired. ?  Having blurry vision.     · You have a wound that will not heal.     · You have an infection that will not go away.     · You have problems with your blood pressure.     · You want more information about diabetes and how you can keep from getting it. Where can you learn more? Go to https://chpepiceweb.[employer]. org and sign in to your ALLGOOB account. Enter I222 in the [employer] box to learn more about \"Prediabetes: Care Instructions. \"     If you do not have an account, please click on the \"Sign Up Now\" link. Current as of: August 31, 2020               Content Version: 12.9  © 2006-2021 [employer]. Care instructions adapted under license by [facility] ([facility]). If you have questions about a medical condition or this instruction, always ask your healthcare professional. [address] any warranty or liability for your use of this information.

## 2021-08-10 LAB
HPV COMMENT: NORMAL
HPV TYPE 16: NOT DETECTED
HPV TYPE 18: NOT DETECTED
HPVOH (OTHER TYPES): NOT DETECTED

## 2021-08-11 ENCOUNTER — OFFICE VISIT (OUTPATIENT)
Dept: FAMILY MEDICINE CLINIC | Age: 56
End: 2021-08-11
Payer: COMMERCIAL

## 2021-08-11 DIAGNOSIS — J06.9 VIRAL URI: Primary | ICD-10-CM

## 2021-08-11 PROCEDURE — 87880 STREP A ASSAY W/OPTIC: CPT | Performed by: NURSE PRACTITIONER

## 2021-08-11 PROCEDURE — 99213 OFFICE O/P EST LOW 20 MIN: CPT | Performed by: NURSE PRACTITIONER

## 2021-08-11 ASSESSMENT — ENCOUNTER SYMPTOMS
EYES NEGATIVE: 1
COUGH: 1
SHORTNESS OF BREATH: 0
WHEEZING: 0
CHEST TIGHTNESS: 0
APNEA: 0
CHOKING: 0
RHINORRHEA: 1
STRIDOR: 0
GASTROINTESTINAL NEGATIVE: 1

## 2021-08-11 NOTE — PROGRESS NOTES
2021    TELEHEALTH EVALUATION -- Audio/Visual (During OCVLI-04 public health emergency)    HPI:    Allegra Verde (:  1965) has requested an audio/video evaluation for the following concern(s):    VV with . Pt c/o sore throat, nasal congestion, and fatigue. Denies fever, chills, or N/V/D. She is not UTD on the COVID vaccine. Denies any known ill contacts. Symptoms started 4 days ago. Review of Systems   Constitutional: Positive for fatigue. Negative for chills and diaphoresis. HENT: Positive for congestion and rhinorrhea. Eyes: Negative. Respiratory: Positive for cough. Negative for apnea, choking, chest tightness, shortness of breath, wheezing and stridor. Cardiovascular: Negative. Gastrointestinal: Negative. Neurological: Negative. Prior to Visit Medications    Medication Sig Taking?  Authorizing Provider   Cholecalciferol (VITAMIN D3) 50 MCG ( UT) CAPS Take 1 capsule by mouth daily  Jesús Shipman MD   bisacodyl (BISACODYL) 5 MG EC tablet Use as directed for colonoscopy prep  Dylan James MD   polyethylene glycol (GLYCOLAX) 17 GM/SCOOP powder Use as directed for colonoscopy prep  Dylan James MD   calcium carbonate (OSCAL) 500 MG TABS tablet Take 500 mg by mouth daily  Historical Provider, MD   Ascorbic Acid (VITAMIN C) 250 MG tablet Take 250 mg by mouth daily  Historical Provider, MD   vitamin B-12 (CYANOCOBALAMIN) 100 MCG tablet Take 50 mcg by mouth daily  Historical Provider, MD   diclofenac sodium (VOLTAREN) 1 % GEL Apply 2-4 g topically 2 times daily  Jesús Shipman MD       Social History     Tobacco Use    Smoking status: Never Smoker    Smokeless tobacco: Never Used   Substance Use Topics    Alcohol use: Yes     Comment: socially    Drug use: Never        Allergies   Allergen Reactions    Hydrocodone Nausea And Vomiting   , No past medical history on file.,   Past Surgical History:   Procedure Laterality Date    ABDOMEN SURGERY N/A     2012, \"cancer near the pancreas\"     SECTION      x3    COLONOSCOPY  2012    DILATION AND CURETTAGE OF UTERUS     ,   Social History     Tobacco Use    Smoking status: Never Smoker    Smokeless tobacco: Never Used   Substance Use Topics    Alcohol use: Yes     Comment: socially    Drug use: Never   , No family history on file.,   There is no immunization history on file for this patient.,   Health Maintenance   Topic Date Due    COVID-19 Vaccine (1) Never done    DTaP/Tdap/Td vaccine (1 - Tdap) Never done    Colon cancer screen colonoscopy  Never done    Breast cancer screen  Never done    Shingles Vaccine (1 of 2) Never done    Flu vaccine (1) 2021    A1C test (Diabetic or Prediabetic)  2022    Lipid screen  2026    Cervical cancer screen  2026    Hepatitis C screen  Completed    HIV screen  Completed    Hepatitis A vaccine  Aged Out    Hepatitis B vaccine  Aged Out    Hib vaccine  Aged Out    Meningococcal (ACWY) vaccine  Aged Out    Pneumococcal 0-64 years Vaccine  Aged Out       PHYSICAL EXAMINATION:  [ INSTRUCTIONS:  \"[x]\" Indicates a positive item  \"[]\" Indicates a negative item  -- DELETE ALL ITEMS NOT EXAMINED]  Vital Signs: (As obtained by patient/caregiver or practitioner observation)    Blood pressure-  Heart rate-    Respiratory rate-    Temperature-  Pulse oximetry-     Constitutional: [] Appears well-developed and well-nourished [x] No apparent distress      [x] Abnormal-  Appears to not feel well  Mental status  [x] Alert and awake  [x] Oriented to person/place/time [x]Able to follow commands      Eyes:  EOM    [x]  Normal  [] Abnormal-  Sclera  [x]  Normal  [] Abnormal -         Discharge [x]  None visible  [] Abnormal -    HENT:   [x] Normocephalic, atraumatic.   [] Abnormal   [x] Mouth/Throat: Mucous membranes are moist.     External Ears [] Normal  [] Abnormal-     Neck: [] No visualized mass     Pulmonary/Chest: [x] Respiratory effort normal.  [] No visualized signs of difficulty breathing or respiratory distress        [] Abnormal-      Musculoskeletal:   [] Normal gait with no signs of ataxia         [] Normal range of motion of neck        [] Abnormal-       Neurological:        [x] No Facial Asymmetry (Cranial nerve 7 motor function) (limited exam to video visit)          [] No gaze palsy        [] Abnormal-         Skin:        [x] No significant exanthematous lesions or discoloration noted on facial skin         [] Abnormal-            Psychiatric:       [] Normal Affect [] No Hallucinations        [] Abnormal-     Other pertinent observable physical exam findings-     ASSESSMENT/PLAN:  1. Viral URI  Advised pt to go to our nearest testing center for rapid Covid and strep test.  If the pt can not go to the nearest testing site, she will find a local urgent care that is closer for her. Advised pt to send PCP results and f/u as needed. Pt VU and agrees with POC.  - COVID-19; Future  - POCT rapid strep A      Return if symptoms worsen or fail to improve. Conrad Ochoa, was evaluated through a synchronous (real-time) audio-video encounter. The patient (or guardian if applicable) is aware that this is a billable service. Verbal consent to proceed has been obtained within the past 12 months. The visit was conducted pursuant to the emergency declaration under the [address] [place] [address] authority and the [employer] General Act. Patient identification was verified, and a caregiver was present when appropriate. The patient was located in a state where the provider was credentialed to provide care. Total time spent on this encounter: Not billed by time    --MAYRA Corona CNP on 8/11/2021 at 12:05 PM    An electronic signature was used to authenticate this note.

## 2021-08-12 ENCOUNTER — HOSPITAL ENCOUNTER (OUTPATIENT)
Dept: MRI IMAGING | Age: 56
Discharge: HOME | End: 2021-08-12
Payer: COMMERCIAL

## 2021-08-12 DIAGNOSIS — C76.2 CANCER OF ABDOMEN (HCC): ICD-10-CM

## 2021-08-12 PROCEDURE — 74181 MRI ABDOMEN W/O CONTRAST: CPT

## 2021-08-20 NOTE — PROGRESS NOTES
Preoperative Screening for Elective Surgery/Invasive Procedures While COVID-19 present in the community     Have you had any of the following symptoms? No  o Fever, chills  o Cough  o Shortness of breath  o Muscle aches/pain  o Diarrhea  o Abdominal pain, nausea, vomiting  o Loss or decrease in taste and / or smell   Risk of Exposure  o Have you recently been hospitalized for COVID-19 or flu-like illness, if so when? No  o Recently diagnosed with COVID-19, if so when? No  o Recently tested for COVID-19, if so when? Last week  o Have you been in close contact with a person or family member who currently has or recently had COVID-23? If yes, when and in what context? No  o Do you live with anybody who in the last 14 days has had fever, chills, shortness of breath, muscle aches, flu-like illness? No  o Do you have any close contacts or family members who are currently in the hospital for COVID-19 or flu-like illness? No  If yes, assess recent close contact with this person. Indicate if the patient has a positive screen by answering yes to one or more of the above questions. Patients who test positive or screen positive prior to surgery or on the day of surgery should be evaluated in conjunction with the surgeon/proceduralist/anesthesiologist to determine the urgency of the procedure.

## 2021-08-20 NOTE — PROGRESS NOTES
Obstructive Sleep Apnea (MARISSA) Screening     Patient:  Audrey Irene    YOB: 1965      Medical Record #:  [id number]                     Date:  8/20/2021     1. Are you a loud and/or regular snorer? []  Yes       [x] No    2. Have you been observed to gasp or stop breathing during sleep? []  Yes       [x] No    3. Do you feel tired or groggy upon awakening or do you awaken with a headache?           []  Yes       [] No    4. Are you often tired or fatigued during the wake time hours? []  Yes       [] No    5. Do you fall asleep sitting, reading, watching TV or driving? []  Yes       [] No    6. Do you often have problems with memory or concentration? []  Yes       [] No    **If patient's score is ? 3 they are considered high risk for MARISSA. An Anesthesia provider will evaluate the patient and develop a plan of care the day of surgery. Note:  If the patient's BMI is more than 35 kg m¯² , has neck circumference > 40 cm, and/or high blood pressure the risk is greater (© American Sleep Apnea Association, 2006).

## 2021-08-23 ENCOUNTER — HOSPITAL ENCOUNTER (OUTPATIENT)
Age: 56
Discharge: HOME | End: 2021-08-23
Payer: COMMERCIAL

## 2021-08-23 LAB — SARS-COV-2: NOT DETECTED

## 2021-08-23 PROCEDURE — U0005 INFEC AGEN DETEC AMPLI PROBE: HCPCS

## 2021-08-23 PROCEDURE — U0003 INFECTIOUS AGENT DETECTION BY NUCLEIC ACID (DNA OR RNA); SEVERE ACUTE RESPIRATORY SYNDROME CORONAVIRUS 2 (SARS-COV-2) (CORONAVIRUS DISEASE [COVID-19]), AMPLIFIED PROBE TECHNIQUE, MAKING USE OF HIGH THROUGHPUT TECHNOLOGIES AS DESCRIBED BY CMS-2020-01-R: HCPCS

## 2021-08-25 ENCOUNTER — ANESTHESIA EVENT (OUTPATIENT)
Dept: ENDOSCOPY | Age: 56
End: 2021-08-25
Payer: COMMERCIAL

## 2021-08-25 NOTE — H&P
past medical history. FAMILY HISTORY     Family History   Problem Relation Age of Onset    High Blood Pressure Mother     Breast Cancer Mother     No Known Problems Father      SOCIAL HISTORY     Social History     Socioeconomic History    Marital status:      Spouse name: Not on file    Number of children: Not on file    Years of education: Not on file    Highest education level: Not on file   Occupational History    Not on file   Tobacco Use    Smoking status: Never Smoker    Smokeless tobacco: Never Used   Substance and Sexual Activity    Alcohol use: Yes     Comment: 0-1 drinks per month    Drug use: Never    Sexual activity: Not on file   Other Topics Concern    Not on file   Social History Narrative    Not on file     Social Determinants of Health     Financial Resource Strain: Low Risk     Difficulty of Paying Living Expenses: Not hard at all   Food Insecurity: No Food Insecurity    Worried About [address] in the Last Year: Never true    [address] [employer] [address] [employer] in the Last Year: Never true   Transportation Needs: No Transportation Needs    Lack of Transportation (Medical): No    Lack of Transportation (Non-Medical):  No   Physical Activity:     Days of Exercise per Week:     Minutes of Exercise per Session:    Stress:     Feeling of Stress :    Social Connections:     Frequency of Communication with Friends and Family:     Frequency of Social Gatherings with Friends and Family:     Attends [employer] Services:     Active Member of Clubs or Organizations:     Attends Club or Organization Meetings:     Marital Status:    Intimate Partner Violence:     Fear of Current or Ex-Partner:     Emotionally Abused:     Physically Abused:     Sexually Abused:      SURGICAL HISTORY     Past Surgical History:   Procedure Laterality Date    ABDOMEN SURGERY N/A     2012, \"cancer near the pancreas\"     SECTION      x3    COLONOSCOPY  2012    DILATION AND CURETTAGE OF UTERUS CURRENT MEDICATIONS   (This list may include medications prescribed during this encounter as epic can not insert only the list prior to this encounter.)  Current Outpatient Rx   Medication Sig Dispense Refill    Cholecalciferol (VITAMIN D3) 50 MCG (2000 UT) CAPS Take 1 capsule by mouth daily 90 capsule 3    bisacodyl (BISACODYL) 5 MG EC tablet Use as directed for colonoscopy prep 4 tablet 0    polyethylene glycol (GLYCOLAX) 17 GM/SCOOP powder Use as directed for colonoscopy prep 238 g 1    calcium carbonate (OSCAL) 500 MG TABS tablet Take 500 mg by mouth daily      Ascorbic Acid (VITAMIN C) 250 MG tablet Take 250 mg by mouth daily      vitamin B-12 (CYANOCOBALAMIN) 100 MCG tablet Take 50 mcg by mouth daily       ALLERGIES     Allergies   Allergen Reactions    Hydrocodone Nausea And Vomiting     IMMUNIZATIONS     There is no immunization history on file for this patient. REVIEW OF SYSTEMS   See HPI for further details and pertinent postiives. Negative for the following:  Constitutional: Negative for weight change. Negative for appetite change and fatigue. HENT: Negative for nosebleeds, sore throat, mouth sores, and voice change. Respiratory: Negative for cough, choking and chest tightness. Cardiovascular: Negative for chest pain   Gastrointestinal: See HPI  Musculoskeletal: Negative for arthralgias. Skin: Negative for pallor. Neurological: Negative for weakness and light-headedness. Hematological: Negative for adenopathy. Does not bruise/bleed easily. Psychiatric/Behavioral: Negative for suicidal ideas. PHYSICAL EXAM   VITAL SIGNS: Ht 5' 2.5\" (1.588 m)   Wt 196 lb (88.9 kg)   BMI 35.28 kg/m²   Wt Readings from Last 3 Encounters:   08/06/21 196 lb (88.9 kg)     Constitutional: Well developed, Well nourished, No acute distress, Non-toxic appearance.    HENT: Normocephalic, Atraumatic, Bilateral external ears normal, Oropharynx moist, No oral exudates, Nose normal.   Eyes: Conjunctiva normal, No discharge. Neck: Normal range of motion, No tenderness, Supple,   Cardiovascular: Normal heart rate, Normal rhythm, No murmurs, No rubs, No gallops. Thorax & Lungs: Normal breath sounds, No respiratory distress, No wheezing, No chest tenderness. Abdomen: Pannus abdomen, normal bowel sounds, soft, non tender, non distended,  Rectal:  Deferred. Skin: Warm, Dry, No erythema, No rash. No bruising. Lower Extremities: Intact distal pulses, No edema, No tenderness, No cyanosis, No clubbing. Neurologic: Alert & oriented x 3, Normal motor function, Normal sensory function, No focal deficits noted. RADIOLOGY/PROCEDURES   MRI ABDOMEN WO CONTRAST    Result Date: 8/12/2021  No acute intra-abnormality Postsurgical change from gastric bypass. Ventral anterior abdominal wall hernia is seen containing fat. FINAL IMPRESSION   Colon cancer screening    Plan: colonoscopy    Colonoscopy with alternatives, rationale, benefits and risks, not limited to bleeding, infection, perforation, need of surgery, prolong hospital stay and anesthesia side effects were explained. Patient verbalized understanding and agrees to proceed with colonoscopy. ORDERED FUTURE/PENDING TESTS     Lab Frequency Next Occurrence   TY DIGITAL SCREEN W OR WO CAD BILATERAL Once 06/04/2021   COVID-19 Ambulatory Once 05/25/2021   COVID-19 Once 08/11/2021       FOLLOWUP   No follow-ups on file.           Marcia Gerardo MD 8/25/21 10:44 AM EDT    CC:  Jazz Laurent MD

## 2021-08-26 ENCOUNTER — HOSPITAL ENCOUNTER (OUTPATIENT)
Age: 56
Setting detail: OUTPATIENT SURGERY
Discharge: HOME | End: 2021-08-26
Attending: INTERNAL MEDICINE | Admitting: INTERNAL MEDICINE
Payer: COMMERCIAL

## 2021-08-26 ENCOUNTER — ANESTHESIA (OUTPATIENT)
Dept: ENDOSCOPY | Age: 56
End: 2021-08-26
Payer: COMMERCIAL

## 2021-08-26 VITALS
HEIGHT: 62 IN | SYSTOLIC BLOOD PRESSURE: 106 MMHG | WEIGHT: 195 LBS | OXYGEN SATURATION: 97 % | HEART RATE: 54 BPM | DIASTOLIC BLOOD PRESSURE: 56 MMHG | BODY MASS INDEX: 35.88 KG/M2 | RESPIRATION RATE: 14 BRPM | TEMPERATURE: 97.5 F

## 2021-08-26 VITALS — DIASTOLIC BLOOD PRESSURE: 51 MMHG | SYSTOLIC BLOOD PRESSURE: 89 MMHG | OXYGEN SATURATION: 98 %

## 2021-08-26 DIAGNOSIS — Z12.11 COLON CANCER SCREENING: ICD-10-CM

## 2021-08-26 PROCEDURE — 7100000011 HC PHASE II RECOVERY - ADDTL 15 MIN: Performed by: INTERNAL MEDICINE

## 2021-08-26 PROCEDURE — 6360000002 HC RX W HCPCS: Performed by: NURSE ANESTHETIST, CERTIFIED REGISTERED

## 2021-08-26 PROCEDURE — 3609027000 HC COLONOSCOPY: Performed by: INTERNAL MEDICINE

## 2021-08-26 PROCEDURE — 2709999900 HC NON-CHARGEABLE SUPPLY: Performed by: INTERNAL MEDICINE

## 2021-08-26 PROCEDURE — 45378 DIAGNOSTIC COLONOSCOPY: CPT | Performed by: INTERNAL MEDICINE

## 2021-08-26 PROCEDURE — 7100000010 HC PHASE II RECOVERY - FIRST 15 MIN: Performed by: INTERNAL MEDICINE

## 2021-08-26 PROCEDURE — 2580000003 HC RX 258: Performed by: INTERNAL MEDICINE

## 2021-08-26 PROCEDURE — 3700000000 HC ANESTHESIA ATTENDED CARE: Performed by: INTERNAL MEDICINE

## 2021-08-26 PROCEDURE — 2580000003 HC RX 258: Performed by: ANESTHESIOLOGY

## 2021-08-26 PROCEDURE — 2500000003 HC RX 250 WO HCPCS: Performed by: NURSE ANESTHETIST, CERTIFIED REGISTERED

## 2021-08-26 PROCEDURE — 3700000001 HC ADD 15 MINUTES (ANESTHESIA): Performed by: INTERNAL MEDICINE

## 2021-08-26 RX ORDER — PROPOFOL 10 MG/ML
INJECTION, EMULSION INTRAVENOUS PRN
Status: DISCONTINUED | OUTPATIENT
Start: 2021-08-26 | End: 2021-08-26 | Stop reason: SDUPTHER

## 2021-08-26 RX ORDER — LIDOCAINE HYDROCHLORIDE 20 MG/ML
INJECTION, SOLUTION INFILTRATION; PERINEURAL PRN
Status: DISCONTINUED | OUTPATIENT
Start: 2021-08-26 | End: 2021-08-26 | Stop reason: SDUPTHER

## 2021-08-26 RX ORDER — HYDRALAZINE HYDROCHLORIDE 20 MG/ML
5 INJECTION INTRAMUSCULAR; INTRAVENOUS EVERY 10 MIN PRN
Status: DISCONTINUED | OUTPATIENT
Start: 2021-08-26 | End: 2021-08-26 | Stop reason: HOSPADM

## 2021-08-26 RX ORDER — SODIUM CHLORIDE 0.9 % (FLUSH) 0.9 %
10 SYRINGE (ML) INJECTION PRN
Status: DISCONTINUED | OUTPATIENT
Start: 2021-08-26 | End: 2021-08-26 | Stop reason: HOSPADM

## 2021-08-26 RX ORDER — SODIUM CHLORIDE, SODIUM LACTATE, POTASSIUM CHLORIDE, CALCIUM CHLORIDE 600; 310; 30; 20 MG/100ML; MG/100ML; MG/100ML; MG/100ML
INJECTION, SOLUTION INTRAVENOUS CONTINUOUS
Status: DISCONTINUED | OUTPATIENT
Start: 2021-08-26 | End: 2021-08-26 | Stop reason: HOSPADM

## 2021-08-26 RX ORDER — SODIUM CHLORIDE, SODIUM LACTATE, POTASSIUM CHLORIDE, CALCIUM CHLORIDE 600; 310; 30; 20 MG/100ML; MG/100ML; MG/100ML; MG/100ML
INJECTION, SOLUTION INTRAVENOUS ONCE
Status: COMPLETED | OUTPATIENT
Start: 2021-08-26 | End: 2021-08-26

## 2021-08-26 RX ORDER — OXYCODONE HYDROCHLORIDE AND ACETAMINOPHEN 5; 325 MG/1; MG/1
2 TABLET ORAL PRN
Status: DISCONTINUED | OUTPATIENT
Start: 2021-08-26 | End: 2021-08-26 | Stop reason: HOSPADM

## 2021-08-26 RX ORDER — PROMETHAZINE HYDROCHLORIDE 25 MG/ML
6.25 INJECTION, SOLUTION INTRAMUSCULAR; INTRAVENOUS
Status: DISCONTINUED | OUTPATIENT
Start: 2021-08-26 | End: 2021-08-26 | Stop reason: HOSPADM

## 2021-08-26 RX ORDER — MORPHINE SULFATE 2 MG/ML
2 INJECTION, SOLUTION INTRAMUSCULAR; INTRAVENOUS EVERY 5 MIN PRN
Status: DISCONTINUED | OUTPATIENT
Start: 2021-08-26 | End: 2021-08-26 | Stop reason: HOSPADM

## 2021-08-26 RX ORDER — LABETALOL HYDROCHLORIDE 5 MG/ML
5 INJECTION, SOLUTION INTRAVENOUS EVERY 10 MIN PRN
Status: DISCONTINUED | OUTPATIENT
Start: 2021-08-26 | End: 2021-08-26 | Stop reason: HOSPADM

## 2021-08-26 RX ORDER — OXYCODONE HYDROCHLORIDE AND ACETAMINOPHEN 5; 325 MG/1; MG/1
1 TABLET ORAL PRN
Status: DISCONTINUED | OUTPATIENT
Start: 2021-08-26 | End: 2021-08-26 | Stop reason: HOSPADM

## 2021-08-26 RX ORDER — LIDOCAINE HYDROCHLORIDE 10 MG/ML
1 INJECTION, SOLUTION EPIDURAL; INFILTRATION; INTRACAUDAL; PERINEURAL
Status: DISCONTINUED | OUTPATIENT
Start: 2021-08-26 | End: 2021-08-26 | Stop reason: HOSPADM

## 2021-08-26 RX ORDER — MEPERIDINE HYDROCHLORIDE 50 MG/ML
12.5 INJECTION INTRAMUSCULAR; INTRAVENOUS; SUBCUTANEOUS EVERY 5 MIN PRN
Status: DISCONTINUED | OUTPATIENT
Start: 2021-08-26 | End: 2021-08-26 | Stop reason: HOSPADM

## 2021-08-26 RX ORDER — MORPHINE SULFATE 2 MG/ML
1 INJECTION, SOLUTION INTRAMUSCULAR; INTRAVENOUS EVERY 5 MIN PRN
Status: DISCONTINUED | OUTPATIENT
Start: 2021-08-26 | End: 2021-08-26 | Stop reason: HOSPADM

## 2021-08-26 RX ORDER — SODIUM CHLORIDE 9 MG/ML
25 INJECTION, SOLUTION INTRAVENOUS PRN
Status: DISCONTINUED | OUTPATIENT
Start: 2021-08-26 | End: 2021-08-26 | Stop reason: HOSPADM

## 2021-08-26 RX ORDER — SODIUM CHLORIDE 0.9 % (FLUSH) 0.9 %
10 SYRINGE (ML) INJECTION EVERY 12 HOURS SCHEDULED
Status: DISCONTINUED | OUTPATIENT
Start: 2021-08-26 | End: 2021-08-26 | Stop reason: HOSPADM

## 2021-08-26 RX ORDER — ONDANSETRON 2 MG/ML
4 INJECTION INTRAMUSCULAR; INTRAVENOUS PRN
Status: DISCONTINUED | OUTPATIENT
Start: 2021-08-26 | End: 2021-08-26 | Stop reason: HOSPADM

## 2021-08-26 RX ORDER — DIPHENHYDRAMINE HYDROCHLORIDE 50 MG/ML
12.5 INJECTION INTRAMUSCULAR; INTRAVENOUS
Status: DISCONTINUED | OUTPATIENT
Start: 2021-08-26 | End: 2021-08-26 | Stop reason: HOSPADM

## 2021-08-26 RX ADMIN — LIDOCAINE HYDROCHLORIDE 80 MG: 20 INJECTION, SOLUTION INFILTRATION; PERINEURAL at 13:21

## 2021-08-26 RX ADMIN — SODIUM CHLORIDE, POTASSIUM CHLORIDE, SODIUM LACTATE AND CALCIUM CHLORIDE: 600; 310; 30; 20 INJECTION, SOLUTION INTRAVENOUS at 12:50

## 2021-08-26 RX ADMIN — PROPOFOL 300 MG: 10 INJECTION, EMULSION INTRAVENOUS at 13:21

## 2021-08-26 RX ADMIN — SODIUM CHLORIDE, SODIUM LACTATE, POTASSIUM CHLORIDE, AND CALCIUM CHLORIDE: .6; .31; .03; .02 INJECTION, SOLUTION INTRAVENOUS at 13:18

## 2021-08-26 RX ADMIN — PROPOFOL 200 MG: 10 INJECTION, EMULSION INTRAVENOUS at 13:32

## 2021-08-26 ASSESSMENT — PAIN SCALES - GENERAL
PAINLEVEL_OUTOF10: 0

## 2021-08-26 ASSESSMENT — PAIN - FUNCTIONAL ASSESSMENT: PAIN_FUNCTIONAL_ASSESSMENT: 0-10

## 2021-08-26 NOTE — OP NOTE
[place]     [facility] ,  [address] [facility]  Phone: [phone] [facility],  [address] [place] [address]  Phone: [phone]   LCU:[phone]    Colonoscopy Procedure Note    Patient: Davenport Sample  : 1965    Procedure: Colonoscopy with polypectomy (snare cautery)    Date:  2021     Endoscopist:  Homero Coleman MD    Referring Physician:  Donovan Auugst MD    Preoperative Diagnosis:  Colon cancer screening [Z12.11]    Postoperative Diagnosis:  See impression    Anesthesia: Anesthesia: MAC  Sedation: Propofol per anesthesia  Start Time:   Stop Time:   ASA Class: 3  Mallampati: I (soft palate, uvula, fauces, tonsillar pillars visible)    Indications: This is a 48y.o. year old female with medical history of obesity (BMI 35.6), gastric bypass surgery seen independently with referral for colon cancer screening    Procedure Details  Informed consent was obtained for the procedure, including sedation. Risks of perforation, hemorrhage, adverse drug reaction and aspiration were discussed. The patient was placed in the left lateral decubitus position. Based on the pre-procedure assessment, including review of the patient's medical history, medications, allergies, and review of systems, she had been deemed to be an appropriate candidate for sedation; she was therefore sedated with the medications listed below. The patient was monitored continuously with ECG tracing, pulse oximetry, blood pressure monitoring, and direct observations. rectal examination was performed. There were no external hemorrhoids, fissures or skin tags. The colonoscope was inserted into the rectum and advanced under direct vision to the terminal ileum. The right colon was examined twice as this increases polyp detection especially if other right colon polyps, older age, male, or smith syndrome.   When segments could not be distended with CO2, it was filled/distended with water. The quality of the colonic preparation was good. A careful inspection was made as the colonoscope was withdrawn, including a retroflexed view of the rectum; findings and interventions are described below. Appropriate photodocumentation Was Obtained: terminal ileum, appendiceal orifice and ileocecal valve. Findings:   A 5 mm sessile polyp in the descending colon, removed by cold snare polypectomy but not retrieved. No bleeding noted. Normal appearing terminal ileum. Small sized non bleeding internal hemorrhoids. Otherwise normal appearing colon mucosa. - PREP: MiraLAX and Dulcolax  - Overall difficulty: mild in degree  - Abdominal pressure: yes - left lower quadrant  - Change in position: no  - Anesthesia issues: no  - Endocoff/Amplieye use: no    Specimens: Was Not Obtained    Complications:   None; patient tolerated the procedure well. Disposition:   PACU - hemodynamically stable. Estimated Blood loss:  none    Withdrawal Time:  11 minutes    Impression:   A 5 mm sessile polyp in the descending colon, removed by cold snare polypectomy but not retrieved. Normal appearing terminal ileum. Small sized non bleeding internal hemorrhoids. Otherwise normal appearing colon mucosa. Recommendations:  -Repeat colonoscopy in 5 years. ,   -Follow up with primary care physician.         Fe Badillo MD 8/26/21 1:45 PM EDT

## 2021-08-26 NOTE — ANESTHESIA PRE PROCEDURE
Department of Anesthesiology  Preprocedure Note       Name:  Euna Boas   Age:  48 y.o.  :  1965                                          MRN:  [id number]         Date:  2021      Surgeon: Silke Stinson):  Blu Hodge MD    Procedure: Procedure(s):  COLONOSCOPY    Medications prior to admission:   Prior to Admission medications    Medication Sig Start Date End Date Taking? Authorizing Provider   Cholecalciferol (VITAMIN D3) 50 MCG ( UT) CAPS Take 1 capsule by mouth daily 21  Gustavo Rea MD   bisacodyl (BISACODYL) 5 MG EC tablet Use as directed for colonoscopy prep 21   Blu Hodge MD   polyethylene glycol [facility]) 17 GM/SCOOP powder Use as directed for colonoscopy prep 21   Blu Hodge MD   calcium carbonate (OSCAL) 500 MG TABS tablet Take 500 mg by mouth daily    Historical Provider, MD   Ascorbic Acid (VITAMIN C) 250 MG tablet Take 250 mg by mouth daily    Historical Provider, MD   vitamin B-12 (CYANOCOBALAMIN) 100 MCG tablet Take 50 mcg by mouth daily    Historical Provider, MD       Current medications:    Current Facility-Administered Medications   Medication Dose Route Frequency Provider Last Rate Last Admin    lactated ringers infusion   IntraVENous Once Blu Hodge MD        lactated ringers infusion   IntraVENous Continuous Rui [place] MD        sodium chloride flush 0.9 % injection 10 mL  10 mL IntraVENous 2 times per day Rui Patterson MD        sodium chloride flush 0.9 % injection 10 mL  10 mL IntraVENous PRN Rui Patterson MD        0.9 % sodium chloride infusion  25 mL IntraVENous PRN Rui Patterson MD        lidocaine PF 1 % injection 1 mL  1 mL Intradermal Once PRN Rui Patterson MD           Allergies:     Allergies   Allergen Reactions    Hydrocodone Nausea And Vomiting       Problem List:    Patient Active Problem List   Diagnosis Code    Pancreatic mass K86.89    Elevated lipase R74.8    Prediabetes R73.03 BUN 15 07/30/2021 08:19 AM    CREATININE 0.6 07/30/2021 08:19 AM    GLUCOSE 101 (H) 07/30/2021 08:19 AM     COAGS  No results found for: PROTIME, INR, APTT          Anesthesia Plan      MAC     ASA 2     (I discussed with the patient the risks and benefits of PIV, anesthesia, IV Narcotics, PACU. All questions were answered the patient agrees with the plan and wishes to proceed)  Induction: intravenous.                         Naa King MD   8/26/2021

## 2021-08-26 NOTE — PROGRESS NOTES
Daughter updated on phone Discharge instructions reviewed with patient and responsible adult  used. Discharge instructions signed and copy given with no additional questions. Patient to be discharged home with belongings.

## 2021-08-31 NOTE — ANESTHESIA POSTPROCEDURE EVALUATION
Department of Anesthesiology  Postprocedure Note    Patient: Adrian Franco  MRN: [id number]  YOB: 1965  Date of evaluation: 8/31/2021  Time:  11:38 AM     Procedure Summary     Date: 08/26/21 Room / Location: [facility] / [facility]    Anesthesia Start: 8008 Anesthesia Stop: 6451    Procedure: COLONOSCOPY DIAGNOSTIC (N/A ) Diagnosis:       Colon cancer screening      (Colon cancer screening [Z12.11])    Surgeons: Edenilson Roe MD Responsible Provider: Tammy Bianchi MD    Anesthesia Type: MAC ASA Status: 2          Anesthesia Type: MAC    Gurpreet Phase I: Gurpreet Score: 10    Gurpreet Phase II: Gurpreet Score: 10    Last vitals: Reviewed and per EMR flowsheets.        Anesthesia Post Evaluation    Comments: Postoperative Anesthesia Note    Name:    Adrian Franco  MRN:      [id number]    Patient Vitals in the past 12 hrs:     LABS:    CBC  Lab Results       Component                Value               Date/Time                  WBC                      3.6 (L)             07/30/2021 08:19 AM        HGB                      13.3                07/30/2021 08:19 AM        HCT                      38.7                07/30/2021 08:19 AM        PLT                      309                 07/30/2021 08:19 AM   RENAL  Lab Results       Component                Value               Date/Time                  NA                       138                 07/30/2021 08:19 AM        K                        4.5                 07/30/2021 08:19 AM        CL                       103                 07/30/2021 08:19 AM        CO2                      26                  07/30/2021 08:19 AM        BUN                      15                  07/30/2021 08:19 AM        CREATININE               0.6                 07/30/2021 08:19 AM        GLUCOSE                  101 (H)             07/30/2021 08:19 AM   COAGS  No results found for: PROTIME, INR, APTT    Intake & Output: @94SMRR@    Nausea & Vomiting:  No    Level of Consciousness:  Awake    Pain Assessment:  Adequate analgesia    Anesthesia Complications:  No apparent anesthetic complications    SUMMARY      Vital signs stable  OK to discharge from Stage I post anesthesia care.   Care transferred from Anesthesiology department on discharge from perioperative area

## 2022-02-18 ENCOUNTER — OFFICE VISIT (OUTPATIENT)
Dept: FAMILY MEDICINE CLINIC | Age: 57
End: 2022-02-18
Payer: COMMERCIAL

## 2022-02-18 VITALS
SYSTOLIC BLOOD PRESSURE: 114 MMHG | DIASTOLIC BLOOD PRESSURE: 78 MMHG | WEIGHT: 196.2 LBS | HEART RATE: 60 BPM | BODY MASS INDEX: 35.89 KG/M2 | OXYGEN SATURATION: 98 %

## 2022-02-18 DIAGNOSIS — R74.8 ELEVATED LIPASE: ICD-10-CM

## 2022-02-18 DIAGNOSIS — Z98.84 HISTORY OF GASTRIC BYPASS: ICD-10-CM

## 2022-02-18 DIAGNOSIS — E55.9 VITAMIN D DEFICIENCY: ICD-10-CM

## 2022-02-18 DIAGNOSIS — R73.03 PREDIABETES: ICD-10-CM

## 2022-02-18 DIAGNOSIS — H91.93 BILATERAL HEARING LOSS, UNSPECIFIED HEARING LOSS TYPE: ICD-10-CM

## 2022-02-18 DIAGNOSIS — D72.819 LEUKOPENIA, UNSPECIFIED TYPE: ICD-10-CM

## 2022-02-18 DIAGNOSIS — G57.12 MERALGIA PARAESTHETICA, LEFT: Primary | ICD-10-CM

## 2022-02-18 DIAGNOSIS — K76.89 HEPATIC CYST: ICD-10-CM

## 2022-02-18 DIAGNOSIS — E78.2 MIXED HYPERLIPIDEMIA: ICD-10-CM

## 2022-02-18 LAB
ALBUMIN SERPL-MCNC: 4 G/DL (ref 3.4–5)
ALP BLD-CCNC: 89 U/L (ref 40–129)
ALT SERPL-CCNC: 31 U/L (ref 10–40)
AST SERPL-CCNC: 24 U/L (ref 15–37)
BASOPHILS ABSOLUTE: 0.1 K/UL (ref 0–0.2)
BASOPHILS RELATIVE PERCENT: 1.2 %
BILIRUB SERPL-MCNC: 0.4 MG/DL (ref 0–1)
BILIRUBIN DIRECT: <0.2 MG/DL (ref 0–0.3)
BILIRUBIN, INDIRECT: NORMAL MG/DL (ref 0–1)
CHOLESTEROL, TOTAL: 169 MG/DL (ref 0–199)
EOSINOPHILS ABSOLUTE: 0.3 K/UL (ref 0–0.6)
EOSINOPHILS RELATIVE PERCENT: 7.1 %
HCT VFR BLD CALC: 39.9 % (ref 36–48)
HDLC SERPL-MCNC: 57 MG/DL (ref 40–60)
HEMOGLOBIN: 13.4 G/DL (ref 12–16)
LDL CHOLESTEROL CALCULATED: 99 MG/DL
LIPASE: 37 U/L (ref 13–60)
LYMPHOCYTES ABSOLUTE: 1.7 K/UL (ref 1–5.1)
LYMPHOCYTES RELATIVE PERCENT: 36.7 %
MCH RBC QN AUTO: 31 PG (ref 26–34)
MCHC RBC AUTO-ENTMCNC: 33.5 G/DL (ref 31–36)
MCV RBC AUTO: 92.7 FL (ref 80–100)
MONOCYTES ABSOLUTE: 0.4 K/UL (ref 0–1.3)
MONOCYTES RELATIVE PERCENT: 9 %
NEUTROPHILS ABSOLUTE: 2.1 K/UL (ref 1.7–7.7)
NEUTROPHILS RELATIVE PERCENT: 46 %
PDW BLD-RTO: 14.9 % (ref 12.4–15.4)
PLATELET # BLD: 361 K/UL (ref 135–450)
PMV BLD AUTO: 7.5 FL (ref 5–10.5)
RBC # BLD: 4.31 M/UL (ref 4–5.2)
TOTAL PROTEIN: 6.4 G/DL (ref 6.4–8.2)
TRIGL SERPL-MCNC: 66 MG/DL (ref 0–150)
VITAMIN D 25-HYDROXY: 37.7 NG/ML
VLDLC SERPL CALC-MCNC: 13 MG/DL
WBC # BLD: 4.7 K/UL (ref 4–11)

## 2022-02-18 PROCEDURE — 90471 IMMUNIZATION ADMIN: CPT | Performed by: FAMILY MEDICINE

## 2022-02-18 PROCEDURE — 99214 OFFICE O/P EST MOD 30 MIN: CPT | Performed by: FAMILY MEDICINE

## 2022-02-18 PROCEDURE — 90715 TDAP VACCINE 7 YRS/> IM: CPT | Performed by: FAMILY MEDICINE

## 2022-02-18 RX ORDER — FLUTICASONE PROPIONATE 50 MCG
2 SPRAY, SUSPENSION (ML) NASAL DAILY
Qty: 16 G | Refills: 5 | Status: SHIPPED | OUTPATIENT
Start: 2022-02-18

## 2022-02-18 ASSESSMENT — ENCOUNTER SYMPTOMS
SHORTNESS OF BREATH: 0
NAUSEA: 0
ABDOMINAL PAIN: 0
VOMITING: 0
COLOR CHANGE: 0
CHEST TIGHTNESS: 0

## 2022-02-18 NOTE — PROGRESS NOTES
2022    This is a 55 y.o. female who presents for  Chief Complaint   Patient presents with    6 Month Follow-Up       HPI:     6 mo f/u    + left anterior thigh numbness, burning   Intermittent   No alleviating or instigating factors  Sometimes with standing at work     Hx of \"peripancreatic surgery\"  MRI abd in  reviewed  Hx of gastric bypass    Vitamin D deficiency, taking daily supplement  Due for mammogram     UTD pap smear with PCP     S/p colonoscopy with Dr. Sanya Wall 21, polypectomy, f/u due in 5 yrs     + HL, b/l, no pain, sometimes with tinnitus     Past Medical History:   Diagnosis Date    Asthma     pt states 1 attack       Past Surgical History:   Procedure Laterality Date    ABDOMEN SURGERY N/A     , \"cancer near the pancreas\"     SECTION      x3    COLONOSCOPY      COLONOSCOPY N/A 2021    COLONOSCOPY DIAGNOSTIC performed by Aileen Espinal MD at [address] [place] [address] History     Socioeconomic History    Marital status:      Spouse name: Not on file    Number of children: Not on file    Years of education: Not on file    Highest education level: Not on file   Occupational History    Not on file   Tobacco Use    Smoking status: Never Smoker    Smokeless tobacco: Never Used   Substance and Sexual Activity    Alcohol use: Yes     Comment: 0-1 drinks per month    Drug use: Never    Sexual activity: Not on file   Other Topics Concern    Not on file   Social History Narrative    Not on file     Social Determinants of Health     Financial Resource Strain: Low Risk     Difficulty of Paying Living Expenses: Not hard at all   Food Insecurity: No Food Insecurity    Worried About [address] in the Last Year: Never true    [address] in the Last Year: Never true   Transportation Needs: No Transportation Needs    Lack of Transportation (Medical): No    Lack of Transportation (Non-Medical):  No Physical Activity:     Days of Exercise per Week: Not on file    Minutes of Exercise per Session: Not on file   Stress:     Feeling of Stress : Not on file   Social Connections:     Frequency of Communication with Friends and Family: Not on file    Frequency of Social Gatherings with Friends and Family: Not on file    Attends Denominational Services: Not on file    Active Member of [address] [place] [address] [employer] or Organizations: Not on file    Attends Club or Organization Meetings: Not on file    Marital Status: Not on file   Intimate Partner Violence:     Fear of Current or Ex-Partner: Not on file    Emotionally Abused: Not on file    Physically Abused: Not on file    Sexually Abused: Not on file   Housing Stability:     Unable to Pay for Housing in the Last Year: Not on file    Number of [place] in the Last Year: Not on file    Unstable Housing in the Last Year: Not on file       Family History   Problem Relation Age of Onset    High Blood Pressure Mother     Breast Cancer Mother     No Known Problems Father        Current Outpatient Medications   Medication Sig Dispense Refill    carbamide peroxide (DEBROX) 6.5 % otic solution Place 5 drops in ear(s) 2 times daily 15 mL 1    fluticasone (FLONASE) 50 MCG/ACT nasal spray 2 sprays by Each Nostril route daily 16 g 5    Cholecalciferol (VITAMIN D3) 50 MCG (2000 UT) CAPS Take 1 capsule by mouth daily 90 capsule 3    calcium carbonate (OSCAL) 500 MG TABS tablet Take 500 mg by mouth daily      Ascorbic Acid (VITAMIN C) 250 MG tablet Take 250 mg by mouth daily      vitamin B-12 (CYANOCOBALAMIN) 100 MCG tablet Take 50 mcg by mouth daily       No current facility-administered medications for this visit.        Immunization History   Administered Date(s) Administered    Tdap (Boostrix, Adacel) 02/18/2022       Allergies   Allergen Reactions    Hydrocodone Nausea And Vomiting       Review of Systems   Constitutional: Negative for activity change, appetite change, chills, diaphoresis, fatigue and fever. HENT: Positive for hearing loss and tinnitus. Negative for ear discharge and ear pain. Eyes: Negative for visual disturbance. Respiratory: Negative for chest tightness and shortness of breath. Cardiovascular: Negative for chest pain, palpitations and leg swelling. Gastrointestinal: Negative for abdominal pain, nausea and vomiting. Genitourinary: Negative for decreased urine volume. Musculoskeletal: Positive for myalgias. Negative for arthralgias. Skin: Negative for color change. Neurological: Positive for numbness. Negative for dizziness, weakness, light-headedness and headaches. Psychiatric/Behavioral: Negative for sleep disturbance. /78 (Site: Left Upper Arm, Position: Sitting, Cuff Size: Medium Adult)   Pulse 60   Wt 196 lb 3.2 oz (89 kg)   SpO2 98%   BMI 35.89 kg/m²     Physical Exam  Vitals and nursing note reviewed. Constitutional:       General: She is not in acute distress. Appearance: She is well-developed. She is not diaphoretic. HENT:      Head: Normocephalic and atraumatic. Eyes:      Conjunctiva/sclera: Conjunctivae normal.      Pupils: Pupils are equal, round, and reactive to light. Neck:      Vascular: No JVD. Cardiovascular:      Rate and Rhythm: Normal rate and regular rhythm. Heart sounds: Normal heart sounds. No murmur heard. No friction rub. No gallop. Pulmonary:      Effort: Pulmonary effort is normal. No respiratory distress. Breath sounds: Normal breath sounds. No wheezing or rales. Chest:      Chest wall: No tenderness. Abdominal:      Palpations: Abdomen is soft. Tenderness: There is no abdominal tenderness. Musculoskeletal:         General: No swelling, tenderness, deformity or signs of injury. Normal range of motion. Cervical back: Normal range of motion and neck supple. Skin:     General: Skin is warm and dry. Capillary Refill: Capillary refill takes 2 to 3 seconds. Findings: No erythema. Neurological:      Mental Status: She is alert and oriented to person, place, and time. Psychiatric:         Behavior: Behavior normal.         Thought Content: Thought content normal.         Judgment: Judgment normal.         Plan  1. Meralgia paraesthetica, left  Supportive care discussed    2. Leukopenia, unspecified type  - CBC with Auto Differential; Future    3. Prediabetes  - Hemoglobin A1C; Future    4. Mixed hyperlipidemia  - Lipid Panel; Future    5. Elevated lipase  - Hepatic Function Panel; Future  - Lipase; Future    6. History of gastric bypass  - CBC with Auto Differential; Future  - Hemoglobin A1C; Future  - Lipid Panel; Future  - Hepatic Function Panel; Future  - Vitamin D 25 Hydroxy; Future  - Lipase; Future    7. Hepatic cyst  - Hepatic Function Panel; Future    8. Vitamin D deficiency  - Vitamin D 25 Hydroxy; Future    9. Bilateral hearing loss, unspecified hearing loss type  - carbamide peroxide (DEBROX) 6.5 % otic solution; Place 5 drops in ear(s) 2 times daily  Dispense: 15 mL; Refill: 1  - fluticasone (FLONASE) 50 MCG/ACT nasal spray; 2 sprays by Each Nostril route daily  Dispense: 16 g; Refill: 5  - Asha Weaver [place]. D., Audiology, Malena Aguirre        While assessing care for this patient, I have reviewed all pertinent lab work/imaging/ specialist notes and care in reference to those problems addressed above in detail. Appropriate medical decision making was based on this. Please note that portions of this note may have been completed with a voice recognition program. Efforts were made to edit the dictations but occasionally words are mis-transcribed.     Return in about 1 year (around 2/18/2023) for annual physical.

## 2022-02-19 LAB
ESTIMATED AVERAGE GLUCOSE: 122.6 MG/DL
HBA1C MFR BLD: 5.9 %

## 2022-03-04 ENCOUNTER — TELEPHONE (OUTPATIENT)
Dept: FAMILY MEDICINE CLINIC | Age: 57
End: 2022-03-04

## 2022-03-04 DIAGNOSIS — E66.01 CLASS 2 SEVERE OBESITY DUE TO EXCESS CALORIES WITH SERIOUS COMORBIDITY AND BODY MASS INDEX (BMI) OF 35.0 TO 35.9 IN ADULT (HCC): Primary | ICD-10-CM

## 2022-03-04 NOTE — TELEPHONE ENCOUNTER
Patient was inquiring for a referral for a nutritionist.  She originally called in on 3/2 for a referral but I misunderstood her and thought she was looking for a referral for her , but it was for her.

## 2022-03-07 NOTE — TELEPHONE ENCOUNTER
Vicente Mcallister left a message for patient stating that referral was done and a list was mailed out.

## 2022-05-20 ENCOUNTER — TELEMEDICINE (OUTPATIENT)
Dept: PRIMARY CARE CLINIC | Age: 57
End: 2022-05-20
Payer: COMMERCIAL

## 2022-05-20 DIAGNOSIS — U07.1 COVID-19: Primary | ICD-10-CM

## 2022-05-20 DIAGNOSIS — R20.0 LOWER EXTREMITY NUMBNESS: ICD-10-CM

## 2022-05-20 DIAGNOSIS — R05.9 COUGH: ICD-10-CM

## 2022-05-20 DIAGNOSIS — R09.81 HEAD CONGESTION: ICD-10-CM

## 2022-05-20 PROCEDURE — 99213 OFFICE O/P EST LOW 20 MIN: CPT | Performed by: NURSE PRACTITIONER

## 2022-05-20 RX ORDER — GUAIFENESIN 600 MG/1
600 TABLET, EXTENDED RELEASE ORAL 2 TIMES DAILY
Qty: 30 TABLET | Refills: 0 | Status: SHIPPED | OUTPATIENT
Start: 2022-05-20 | End: 2022-06-04

## 2022-05-20 RX ORDER — FLUTICASONE PROPIONATE 50 MCG
2 SPRAY, SUSPENSION (ML) NASAL DAILY
Qty: 16 G | Refills: 0 | Status: SHIPPED | OUTPATIENT
Start: 2022-05-20

## 2022-05-20 RX ORDER — BENZONATATE 100 MG/1
100-200 CAPSULE ORAL 3 TIMES DAILY PRN
Qty: 40 CAPSULE | Refills: 0 | Status: SHIPPED | OUTPATIENT
Start: 2022-05-20 | End: 2022-05-27

## 2022-05-20 ASSESSMENT — ENCOUNTER SYMPTOMS
COUGH: 1
CONSTIPATION: 0
SORE THROAT: 0
VOMITING: 0
NAUSEA: 0
WHEEZING: 0
CHEST TIGHTNESS: 0
SHORTNESS OF BREATH: 0
DIARRHEA: 0

## 2022-05-20 NOTE — PATIENT INSTRUCTIONS
Notify office if you do not improve or have worsening of condition. Take medication as prescribed. Drink plenty of fluids and rest.  Practice good hand hygiene. May sleep with humidifier as needed. Gargle with warm salt water to help with sore throat. You can use ice, warm chicken noodle soup, soft foods, popsicles and cough drops to help soothe your throat. May take Tylenol/Ibuprofen (alternate) as needed for fever/pain. Do not eat or drink after anyone. Do  not share utensils. Toward the end of the course, change out toothbrush to a new one. Cover your mouth and nose when you cough or sneeze. Continue to mask and follow CDC Quarantine recommendations. With Positive COVID, Currently, the CDC recommends staying at home in self isolation for at least 5 days. After that, if you are without a fever and symptoms are improving, you may go out, but only if wearing a mask for an additional 5 days. Thankfully, most people recover uneventfully. The mainstay of treatment for most people remains focused on symptom control, isolating and watching for signs of worsening illness. You should drink plenty of fluids, eat when you are able, and rest as much as possible. Please seek more urgent medical attention if you develop any of the following:  Chest pain  Shortness of breath  Bluish lips or face  Severe headache   Severe dizziness or passing out  New confusion  Inability to stay awake  Extreme weakness    If you have a pulse oximeter, check it at least daily. Seek urgent medical attention if it drops to 92% or below.

## 2022-05-20 NOTE — LETTER
NOTIFICATION RETURN TO WORK / SCHOOL    5/20/2022    Ms. Avril [facility] [address]      To Whom It May Concern:    Ayde Ponce was tested for COVID-19 on 5/20, and the result was positive. She may return to work on 5/30. I recommend:return without restrictions when is she is fully recovered and meets the requirements to end isolation. If there are questions or concerns, please have the patient contact our office.         Sincerely,        Callie Ordaz, APRN - CNP

## 2022-05-20 NOTE — LETTER
I had the pleasure of seeing Aubree Patrick today for a primary care virtualist video visit secondary to Positive COVID 19. I have provided the following recommendations: Please refer to note. I have included my note for your review and have asked the patient to follow up with you 2-3 weeks and for intermittent numbness of leg. If you have questions, please reach out via [employer] secure messaging by searching for the [facility] Primary Care Virtualists. Your communication will be answered promptly by the Virtualist on service for the day. Additionally, we would love your overall feedback on this visit. Please hit shift and click the following link to let us know if the Virtualist service met your expectations. [employer] [id number]      Electronically signed by MAYRA Lam CNP on 5/20/22 at 8:52 PM EDT

## 2022-05-20 NOTE — PROGRESS NOTES
2022    TELEHEALTH EVALUATION -- Audio/Visual (During THQJP-91 public health emergency)    Appointment Completed with use of Confidential   [employer] (Audio)  [phone] . Chief Complaint   Patient presents with    Positive For Covid-19    Cough     HPI:    Pedro Gonzalez (:  1965) has requested an audio/video evaluation for the following concern(s):    Patient with cold like symptoms, the fever/chills, muscle aches and not feeling well, fatigued. Tested for COVID and positive. Fever/chills and aches resolved a day ago. Now with continued fatigue and mainly cough worse at night does not allow her to rest.  Denies SOB, CP, wheezing, N/V/D. H/O asthma years ago no issues since then. Does not use inhalers. Did not receive COVID vaccine. Patient had one question regarding intermittent leg numbness/no pain verbalized she has had, felt mostly unrelated to COVID, as that would appear more vascular and we did discuss to notify office or ED immediately if blue color to face, lips, hands or feet and she verbalized an understanding. However, I did recommend in 3-4 weeks to follow up this numbness after COVID quarantine to discuss and have in office examination and evaluation for this immediately as sometimes this can be related to nerve issue off the back so to watch any and avoid heavy lifting, pushing, pulling, until she can be seen, if worsens call office sooner. She verbalized understanding. Review of Systems   Constitutional: Positive for chills, fatigue and fever. HENT: Positive for congestion and postnasal drip. Negative for sore throat. Respiratory: Positive for cough. Negative for chest tightness, shortness of breath and wheezing. Cardiovascular: Negative. Gastrointestinal: Negative for constipation, diarrhea, nausea and vomiting. Genitourinary: Negative. Musculoskeletal: Positive for myalgias. Skin: Negative. Neurological: Negative. Psychiatric/Behavioral: Negative. Prior to Visit Medications    Medication Sig Taking?  Authorizing Provider   guaiFENesin (MUCINEX) 600 MG extended release tablet Take 1 tablet by mouth 2 times daily for 15 days Yes MAYRA Bowers - CNP   fluticasone (FLONASE) 50 MCG/ACT nasal spray 2 sprays by Each Nostril route daily Yes MAYRA Bowers CNP   benzonatate (TESSALON) 100 MG capsule Take 1-2 capsules by mouth 3 times daily as needed for Cough Yes MAYRA Bowers - CNP   fluticasone (FLONASE) 50 MCG/ACT nasal spray 2 sprays by Each Nostril route daily  Luis Hendrickson MD   Cholecalciferol (VITAMIN D3) 50 MCG (2000 UT) CAPS Take 1 capsule by mouth daily  Luis Hendrickson MD   calcium carbonate (OSCAL) 500 MG TABS tablet Take 500 mg by mouth daily  Historical Provider, MD   Ascorbic Acid (VITAMIN C) 250 MG tablet Take 250 mg by mouth daily  Historical Provider, MD   vitamin B-12 (CYANOCOBALAMIN) 100 MCG tablet Take 50 mcg by mouth daily  Historical Provider, MD       Social History     Tobacco Use    Smoking status: Never Smoker    Smokeless tobacco: Never Used   Substance Use Topics    Alcohol use: Yes     Comment: 0-1 drinks per month    Drug use: Never        Allergies   Allergen Reactions    Hydrocodone Nausea And Vomiting   ,   Past Medical History:   Diagnosis Date    Asthma     pt states 1 attack   ,   Past Surgical History:   Procedure Laterality Date    ABDOMEN SURGERY N/A     , \"cancer near the pancreas\"     SECTION      x3    COLONOSCOPY      COLONOSCOPY N/A 2021    COLONOSCOPY DIAGNOSTIC performed by Erum Griffin MD at [address] [place] [address]     ,   Social History     Tobacco Use    Smoking status: Never Smoker    Smokeless tobacco: Never Used   Substance Use Topics    Alcohol use: Yes     Comment: 0-1 drinks per month    Drug use: Never   ,   Family History   Problem Relation Age of Onset    High Blood Pressure Mother     Breast Cancer Mother     No Known Problems Father    ,   Immunization History   Administered Date(s) Administered    Tdap (Boostrix, Adacel) 02/18/2022       PHYSICAL EXAMINATION:  [ INSTRUCTIONS:  \"[x]\" Indicates a positive item  \"[]\" Indicates a negative item  -- DELETE ALL ITEMS NOT EXAMINED]  Vital Signs: (As obtained by patient/caregiver or practitioner observation)    Blood pressure-  Heart rate-    Respiratory rate-    Temperature-  Pulse oximetry-     Constitutional: [x] Appears well-developed and well-nourished [x] No apparent distress      [x] Abnormal-  Appears ill  Mental status  [x] Alert and awake  [x] Oriented to person/place/time [x]Able to follow commands      Eyes:  EOM    [x]  Normal  [] Abnormal-  Sclera  [x]  Normal  [] Abnormal -         Discharge [x]  None visible  [] Abnormal -    HENT:   [x] Normocephalic, atraumatic. [] Abnormal   [] Mouth/Throat: Mucous membranes are moist.     External Ears [x] Normal  [] Abnormal-     Neck: [x] No visualized mass     Pulmonary/Chest: [x] Respiratory effort normal.  [x] No visualized signs of difficulty breathing or respiratory distress        [x] Abnormal- cough noted on exam/asessment     Musculoskeletal:   [x] Normal gait with no signs of ataxia         [x] Normal range of motion of neck        [] Abnormal-       Neurological:        [x] No Facial Asymmetry (Cranial nerve 7 motor function) (limited exam to video visit)          [x] No gaze palsy        [] Abnormal-         Skin:        [x] No significant exanthematous lesions or discoloration noted on facial skin         [] Abnormal-            Psychiatric:       [x] Normal Affect [] No Hallucinations        [] Abnormal-     Other pertinent observable physical exam findings-     ASSESSMENT/PLAN:    1. COVID-19- New  Notify office if you do not improve or have worsening of condition. Take medication as prescribed. Drink plenty of fluids and rest.  Practice good hand hygiene.   May sleep with humidifier as needed. Gargle with warm salt water to help with sore throat. You can use ice, warm chicken noodle soup, soft foods, popsicles and cough drops to help soothe your throat. May take Tylenol/Ibuprofen (alternate) as needed for fever/pain. Do not eat or drink after anyone. Do  not share utensils. Toward the end of the course, change out toothbrush to a new one. Cover your mouth and nose when you cough or sneeze. Continue to mask and follow CDC Quarantine recommendations. With Positive COVID, Currently, the CDC recommends staying at home in self isolation for at least 5 days. After that, if you are without a fever and symptoms are improving, you may go out, but only if wearing a mask for an additional 5 days. Thankfully, most people recover uneventfully. The mainstay of treatment for most people remains focused on symptom control, isolating and watching for signs of worsening illness. You should drink plenty of fluids, eat when you are able, and rest as much as possible. Please seek more urgent medical attention if you develop any of the following:  Chest pain  Shortness of breath  Bluish lips or face  Severe headache   Severe dizziness or passing out  New confusion  Inability to stay awake  Extreme weakness    If you have a pulse oximeter, check it at least daily. Seek urgent medical attention if it drops to 92% or below. Work note under letters. Out for [place] period for unvaccinated person. Dicussed if work needs FMLA or STD forms filled out would require follow up with PCP to complete these forms. She verbalized an understanding and states she does not think that will be needed. 2. Cough  Notify office if you have no improvement or worsening of condition. See educational handouts with AVS.  Meds as prescribed. Follow up with PCP if no improvement or worsening in symptoms. See above plan. - guaiFENesin (MUCINEX) 600 MG extended release tablet;  Take 1 tablet by mouth 2 times daily for 15 days  Dispense: 30 tablet; Refill: 0  - benzonatate (TESSALON) 100 MG capsule; Take 1-2 capsules by mouth 3 times daily as needed for Cough  Dispense: 40 capsule; Refill: 0    3. Head congestion  Notify office if you have no improvement or worsening of condition. See educational handouts with AVS.  Meds as prescribed. Follow up with PCP if no improvement or worsening in symptoms. See above plan. - fluticasone (FLONASE) 50 MCG/ACT nasal spray; 2 sprays by Each Nostril route daily  Dispense: 16 g; Refill: 0    4. Lower Extremity numbness-Intermittent  When over COVID schedule with PCP for further examination. Notify office if you have no improvement or worsening of condition. See educational handouts with AVS.    Return in about 2 weeks (around 6/3/2022), or if symptoms remain the same and/or if symptoms worsen or fail to improve. Nara Meza, was evaluated through a synchronous (real-time) audio-video encounter. The patient (or guardian if applicable) is aware that this is a billable service, which includes applicable co-pays. This Virtual Visit was conducted with patient's (and/or legal guardian's) consent. The visit was conducted pursuant to the emergency declaration under the [address] [place] [address], [address] [place] [address] authority and the [place] [employer] [place] General Act. Patient identification was verified, and a caregiver was present when appropriate. The patient was located at home in a state where the provider was licensed to provide care. Total time spent on this encounter: 29 minutes    --MAYRA Lee CNP on 5/20/2022 at 9:47 AM    An electronic signature was used to authenticate this note.

## 2022-05-20 NOTE — LETTER
NOTIFICATION RETURN TO WORK / SCHOOL    5/20/2022    Ms. [facility] [address]      To Whom It May Concern:    Rajesh Amador was tested for COVID-19 on 5/20, and the result was negative. She may return to work on 5/30. I recommend:return without restrictions    If there are questions or concerns, please have the patient contact our office.         Sincerely,        Bill Christianson, MAYRA - CNP

## 2022-05-20 NOTE — Clinical Note
[address] [place] [id number]  Phone: [phone]  Fax: [phone] J MAYRA Dang - CNP        May 20, 2022     Patient: Malcom Carlos   YOB: 1965   Date of Visit: 5/20/2022       To Whom It May Concern: It is my medical opinion that Malcom Carlos {Work release (duty restriction):29162}. If you have any questions or concerns, please don't hesitate to call.     Sincerely,        MAYRA Kelly - CNP

## 2022-08-18 ENCOUNTER — HOSPITAL ENCOUNTER (OUTPATIENT)
Dept: WOMENS IMAGING | Age: 57
Discharge: HOME | End: 2022-08-18
Payer: COMMERCIAL

## 2022-08-18 DIAGNOSIS — Z12.31 ENCOUNTER FOR SCREENING MAMMOGRAM FOR MALIGNANT NEOPLASM OF BREAST: ICD-10-CM

## 2022-08-18 PROCEDURE — 77067 SCR MAMMO BI INCL CAD: CPT

## 2022-08-29 ENCOUNTER — TELEPHONE (OUTPATIENT)
Dept: WOMENS IMAGING | Age: 57
End: 2022-08-29

## 2022-09-02 ENCOUNTER — TELEPHONE (OUTPATIENT)
Dept: FAMILY MEDICINE CLINIC | Age: 57
End: 2022-09-02

## 2022-09-02 DIAGNOSIS — N63.0 BREAST MASS IN FEMALE: Primary | ICD-10-CM

## 2022-09-02 NOTE — TELEPHONE ENCOUNTER
Per chart the breast center contacted patient the same day. She is scheduled for additional imaging on 9/16/22        Question regarding TY Digital Screen Bilateral  (Newest Message First)  SANDI Cordoba MD 4 days ago       Please advise. Karla Pulling [address] [place] [address]"  Adriana Orozco MD 4 days ago     Laura DAWKINS Dr, Good morning, I would like to know if I need any other mammography since the result says that something is seen in the left breast. Anything that has to call me, can call me before 11 a.m. Thank you and good day.

## 2022-09-06 NOTE — TELEPHONE ENCOUNTER
The patient has been notified of this information and all questions answered, scheduled for 9/16/22.

## 2022-09-06 NOTE — TELEPHONE ENCOUNTER
Yes! Needs f/u imaging based on her previous results. Usually the breast center reaches out to schedule this, so I'm wondering why this hasn't happened. Stefano Irvin, can you clarify? Please update pt and have her call to schedule a follow up diagnostic mammogram and breast US. I will place orders today.      She is [place] Islander speaking

## 2022-09-16 ENCOUNTER — HOSPITAL ENCOUNTER (OUTPATIENT)
Dept: WOMENS IMAGING | Age: 57
Discharge: HOME | End: 2022-09-16
Payer: COMMERCIAL

## 2022-09-16 DIAGNOSIS — R92.8 ABNORMAL MAMMOGRAM: ICD-10-CM

## 2022-09-16 PROCEDURE — G0279 TOMOSYNTHESIS, MAMMO: HCPCS

## 2022-09-16 PROCEDURE — 76642 ULTRASOUND BREAST LIMITED: CPT

## 2022-11-21 ENCOUNTER — OFFICE VISIT (OUTPATIENT)
Dept: FAMILY MEDICINE CLINIC | Age: 57
End: 2022-11-21
Payer: COMMERCIAL

## 2022-11-21 VITALS
OXYGEN SATURATION: 94 % | SYSTOLIC BLOOD PRESSURE: 110 MMHG | TEMPERATURE: 99.8 F | DIASTOLIC BLOOD PRESSURE: 70 MMHG | BODY MASS INDEX: 36.07 KG/M2 | HEIGHT: 62 IN | WEIGHT: 196 LBS | HEART RATE: 93 BPM

## 2022-11-21 DIAGNOSIS — J10.1 INFLUENZA A: Primary | ICD-10-CM

## 2022-11-21 DIAGNOSIS — R50.81 FEVER IN OTHER DISEASES: ICD-10-CM

## 2022-11-21 LAB
INFLUENZA A ANTIBODY: POSITIVE
INFLUENZA B ANTIBODY: POSITIVE

## 2022-11-21 PROCEDURE — 87804 INFLUENZA ASSAY W/OPTIC: CPT | Performed by: NURSE PRACTITIONER

## 2022-11-21 PROCEDURE — 99213 OFFICE O/P EST LOW 20 MIN: CPT | Performed by: NURSE PRACTITIONER

## 2022-11-21 RX ORDER — OSELTAMIVIR PHOSPHATE 75 MG/1
75 CAPSULE ORAL 2 TIMES DAILY
Qty: 10 CAPSULE | Refills: 0 | Status: SHIPPED | OUTPATIENT
Start: 2022-11-21 | End: 2022-11-26

## 2022-11-21 SDOH — ECONOMIC STABILITY: FOOD INSECURITY: WITHIN THE PAST 12 MONTHS, YOU WORRIED THAT YOUR FOOD WOULD RUN OUT BEFORE YOU GOT MONEY TO BUY MORE.: NEVER TRUE

## 2022-11-21 SDOH — ECONOMIC STABILITY: TRANSPORTATION INSECURITY
IN THE PAST 12 MONTHS, HAS LACK OF TRANSPORTATION KEPT YOU FROM MEETINGS, WORK, OR FROM GETTING THINGS NEEDED FOR DAILY LIVING?: YES

## 2022-11-21 SDOH — ECONOMIC STABILITY: HOUSING INSECURITY
IN THE LAST 12 MONTHS, WAS THERE A TIME WHEN YOU DID NOT HAVE A STEADY PLACE TO SLEEP OR SLEPT IN A SHELTER (INCLUDING NOW)?: NO

## 2022-11-21 SDOH — ECONOMIC STABILITY: TRANSPORTATION INSECURITY
IN THE PAST 12 MONTHS, HAS THE LACK OF TRANSPORTATION KEPT YOU FROM MEDICAL APPOINTMENTS OR FROM GETTING MEDICATIONS?: YES

## 2022-11-21 SDOH — ECONOMIC STABILITY: HOUSING INSECURITY: IN THE LAST 12 MONTHS, HOW MANY PLACES HAVE YOU LIVED?: 1

## 2022-11-21 SDOH — ECONOMIC STABILITY: FOOD INSECURITY: WITHIN THE PAST 12 MONTHS, THE FOOD YOU BOUGHT JUST DIDN'T LAST AND YOU DIDN'T HAVE MONEY TO GET MORE.: NEVER TRUE

## 2022-11-21 SDOH — ECONOMIC STABILITY: INCOME INSECURITY: IN THE LAST 12 MONTHS, WAS THERE A TIME WHEN YOU WERE NOT ABLE TO PAY THE MORTGAGE OR RENT ON TIME?: NO

## 2022-11-21 ASSESSMENT — SOCIAL DETERMINANTS OF HEALTH (SDOH): HOW HARD IS IT FOR YOU TO PAY FOR THE VERY BASICS LIKE FOOD, HOUSING, MEDICAL CARE, AND HEATING?: NOT HARD AT ALL

## 2022-11-21 ASSESSMENT — ENCOUNTER SYMPTOMS
RHINORRHEA: 1
NAUSEA: 1
VOMITING: 1
SORE THROAT: 1
DIARRHEA: 0
COUGH: 1

## 2022-11-21 NOTE — LETTER
[address]  [facility] [address]  Phone: [phone]  Fax: [phone]    MAYRA Jennings CNP        November 21, 2022     Patient: Carolina Maki   YOB: 1965   Date of Visit: 11/21/2022       To Whom It May Concern: It is my medical opinion that Carolina Maki may return to work on 11/28/22. If you have any questions or concerns, please don't hesitate to call.     Sincerely,        MAYRA Jennings CNP

## 2022-11-21 NOTE — PROGRESS NOTES
Jadyn Shelton (:  1965) is a 65 y.o. female,Established patient, here for evaluation of the following chief complaint(s):  Headache (22 symptoms started, currently has body ached, vomiting )         ASSESSMENT/PLAN:  1. Influenza A  -     oseltamivir (TAMIFLU) 75 MG capsule; Take 1 capsule by mouth 2 times daily for 5 days, Disp-10 capsule, R-0Normal  2. Fever in other diseases  -     POCT Influenza A/B- positive      -Stay hydrated  -Clear liquids, advance as tolerates to bland diet such as BRAT diet   -Get plenty of rest  -If symptoms worsen or do not improve, notify office   -Discussed alarm symptoms such as shortness of breath, chest pain, or difficulty breathing, go to ER   -Warm air humidifier, steamy showers      Return if symptoms worsen or fail to improve. Subjective   SUBJECTIVE/OBJECTIVE:  Symptoms started yesterday  Patient has not been taking any medication at home for her symptoms       Headache    Review of Systems   Constitutional:  Positive for appetite change, fatigue and fever. Negative for chills. HENT:  Positive for congestion, postnasal drip, rhinorrhea and sore throat. Negative for ear discharge and ear pain. Respiratory:  Positive for cough. Gastrointestinal:  Positive for nausea and vomiting. Negative for diarrhea. Musculoskeletal:  Negative for myalgias. Neurological:  Positive for headaches. Objective   Physical Exam  Vitals reviewed. Constitutional:       Appearance: She is ill-appearing. HENT:      Head: Normocephalic. Right Ear: Tympanic membrane, ear canal and external ear normal.      Left Ear: Tympanic membrane, ear canal and external ear normal.      Nose: Rhinorrhea present. Right Sinus: Maxillary sinus tenderness present. No frontal sinus tenderness. Left Sinus: Maxillary sinus tenderness present. No frontal sinus tenderness.       Mouth/Throat:      Mouth: Mucous membranes are moist.      Pharynx: Posterior oropharyngeal erythema present. No oropharyngeal exudate. Eyes:      General:         Right eye: No discharge. Left eye: No discharge. Pupils: Pupils are equal, round, and reactive to light. Cardiovascular:      Rate and Rhythm: Normal rate and regular rhythm. Heart sounds: Normal heart sounds, S1 normal and S2 normal.   Pulmonary:      Effort: Pulmonary effort is normal.      Breath sounds: Normal breath sounds. No wheezing, rhonchi or rales. Chest:      Chest wall: No tenderness. Abdominal:      General: There is no distension. Palpations: Abdomen is soft. Tenderness: There is no abdominal tenderness. Musculoskeletal:      Cervical back: Normal range of motion. Lymphadenopathy:      Cervical: No cervical adenopathy. Skin:     General: Skin is warm and dry. Capillary Refill: Capillary refill takes less than 2 seconds. Neurological:      General: No focal deficit present. Mental Status: She is oriented to person, place, and time. This note was generated completely or in part utilizing Dragon dictation speech recognition software. Occasionally, words are mistranscribed and despite editing, the text may contain inaccuracies due to incorrect word recognition. If further clarification is needed please contact the office at [phone]. An electronic signature was used to authenticate this note.     --Freya Olivas, APRN - CNP

## 2022-11-28 ENCOUNTER — TELEPHONE (OUTPATIENT)
Dept: FAMILY MEDICINE CLINIC | Age: 57
End: 2022-11-28

## 2022-11-28 DIAGNOSIS — R05.1 ACUTE COUGH: ICD-10-CM

## 2022-11-28 DIAGNOSIS — J10.1 INFLUENZA A: Primary | ICD-10-CM

## 2022-11-28 RX ORDER — BENZONATATE 200 MG/1
200 CAPSULE ORAL 3 TIMES DAILY PRN
Qty: 30 CAPSULE | Refills: 0 | Status: SHIPPED | OUTPATIENT
Start: 2022-11-28 | End: 2022-12-08

## 2022-11-28 NOTE — TELEPHONE ENCOUNTER
Patient stated that her symptoms are improving other than the cough. She stated that it seemed like the [place] Feast was working. She stated that she would like to try medication again and see if it works before scheduling another appointment. Patient was informed that the cough could linger 1-3 months. Patient stated that she would like her prescription called into [facility] on [address].

## 2022-11-28 NOTE — TELEPHONE ENCOUNTER
Patient called in stating her cough has not gotten better. She was asking for medication. Patient can be reached at [phone] and will need an .

## 2023-02-24 ENCOUNTER — OFFICE VISIT (OUTPATIENT)
Dept: FAMILY MEDICINE CLINIC | Age: 58
End: 2023-02-24

## 2023-02-24 VITALS
OXYGEN SATURATION: 97 % | HEIGHT: 62 IN | DIASTOLIC BLOOD PRESSURE: 74 MMHG | TEMPERATURE: 97.8 F | BODY MASS INDEX: 36.8 KG/M2 | WEIGHT: 200 LBS | HEART RATE: 80 BPM | SYSTOLIC BLOOD PRESSURE: 118 MMHG

## 2023-02-24 DIAGNOSIS — R73.03 PREDIABETES: ICD-10-CM

## 2023-02-24 DIAGNOSIS — E78.2 MIXED HYPERLIPIDEMIA: ICD-10-CM

## 2023-02-24 DIAGNOSIS — E55.9 VITAMIN D DEFICIENCY: ICD-10-CM

## 2023-02-24 DIAGNOSIS — R74.8 ELEVATED LIPASE: ICD-10-CM

## 2023-02-24 DIAGNOSIS — Z00.00 ENCOUNTER FOR WELL ADULT EXAM WITHOUT ABNORMAL FINDINGS: Primary | ICD-10-CM

## 2023-02-24 DIAGNOSIS — Z13.0 SCREENING FOR DEFICIENCY ANEMIA: ICD-10-CM

## 2023-02-24 DIAGNOSIS — L03.115 CELLULITIS OF RIGHT LOWER EXTREMITY: ICD-10-CM

## 2023-02-24 DIAGNOSIS — T14.8XXA BITE: ICD-10-CM

## 2023-02-24 RX ORDER — METRONIDAZOLE 7.5 MG/G
GEL TOPICAL
COMMUNITY
Start: 2023-01-10

## 2023-02-24 RX ORDER — CETIRIZINE HYDROCHLORIDE 10 MG/1
10 TABLET ORAL DAILY
Qty: 30 TABLET | Refills: 0 | Status: SHIPPED | OUTPATIENT
Start: 2023-02-24 | End: 2023-03-26

## 2023-02-24 RX ORDER — AMOXICILLIN AND CLAVULANATE POTASSIUM 875; 125 MG/1; MG/1
1 TABLET, FILM COATED ORAL 2 TIMES DAILY
Qty: 14 TABLET | Refills: 0 | Status: SHIPPED | OUTPATIENT
Start: 2023-02-24 | End: 2023-03-03

## 2023-02-24 ASSESSMENT — PATIENT HEALTH QUESTIONNAIRE - PHQ9
SUM OF ALL RESPONSES TO PHQ9 QUESTIONS 1 & 2: 0
9. THOUGHTS THAT YOU WOULD BE BETTER OFF DEAD, OR OF HURTING YOURSELF: 0
7. TROUBLE CONCENTRATING ON THINGS, SUCH AS READING THE NEWSPAPER OR WATCHING TELEVISION: 0
10. IF YOU CHECKED OFF ANY PROBLEMS, HOW DIFFICULT HAVE THESE PROBLEMS MADE IT FOR YOU TO DO YOUR WORK, TAKE CARE OF THINGS AT HOME, OR GET ALONG WITH OTHER PEOPLE: 0
SUM OF ALL RESPONSES TO PHQ QUESTIONS 1-9: 1
5. POOR APPETITE OR OVEREATING: 0
SUM OF ALL RESPONSES TO PHQ QUESTIONS 1-9: 1
6. FEELING BAD ABOUT YOURSELF - OR THAT YOU ARE A FAILURE OR HAVE LET YOURSELF OR YOUR FAMILY DOWN: 0
SUM OF ALL RESPONSES TO PHQ QUESTIONS 1-9: 1
4. FEELING TIRED OR HAVING LITTLE ENERGY: 1
3. TROUBLE FALLING OR STAYING ASLEEP: 0
2. FEELING DOWN, DEPRESSED OR HOPELESS: 0
SUM OF ALL RESPONSES TO PHQ QUESTIONS 1-9: 1
1. LITTLE INTEREST OR PLEASURE IN DOING THINGS: 0
8. MOVING OR SPEAKING SO SLOWLY THAT OTHER PEOPLE COULD HAVE NOTICED. OR THE OPPOSITE, BEING SO FIGETY OR RESTLESS THAT YOU HAVE BEEN MOVING AROUND A LOT MORE THAN USUAL: 0

## 2023-02-24 ASSESSMENT — ANXIETY QUESTIONNAIRES
GAD7 TOTAL SCORE: 1
6. BECOMING EASILY ANNOYED OR IRRITABLE: 0
7. FEELING AFRAID AS IF SOMETHING AWFUL MIGHT HAPPEN: 0
5. BEING SO RESTLESS THAT IT IS HARD TO SIT STILL: 0
3. WORRYING TOO MUCH ABOUT DIFFERENT THINGS: 1
2. NOT BEING ABLE TO STOP OR CONTROL WORRYING: 0
4. TROUBLE RELAXING: 0
1. FEELING NERVOUS, ANXIOUS, OR ON EDGE: 0
IF YOU CHECKED OFF ANY PROBLEMS ON THIS QUESTIONNAIRE, HOW DIFFICULT HAVE THESE PROBLEMS MADE IT FOR YOU TO DO YOUR WORK, TAKE CARE OF THINGS AT HOME, OR GET ALONG WITH OTHER PEOPLE: NOT DIFFICULT AT ALL

## 2023-02-24 ASSESSMENT — ENCOUNTER SYMPTOMS
COLOR CHANGE: 1
NAUSEA: 0
VOMITING: 0
SHORTNESS OF BREATH: 0
ABDOMINAL PAIN: 0
CHEST TIGHTNESS: 0

## 2023-02-24 NOTE — PATIENT INSTRUCTIONS
Starting a Weight Loss Plan: Care Instructions  Overview     If you're thinking about losing weight, it can be hard to know where to start. Your doctor can help you set up a weight loss plan that best meets your needs. You may want to take a class on nutrition or exercise, or you could join a weight loss support group. If you have questions about how to make changes to your eating or exercise habits, ask your doctor about seeing a registered dietitian or an exercise specialist.  It can be a big challenge to lose weight. But you don't have to make huge changes at once. Make small changes, and stick with them. When those changes become habit, add a few more changes. If you don't think you're ready to make changes right now, try to pick a date in the future. Make an appointment to see your doctor to discuss whether the time is right for you to start a plan. Follow-up care is a key part of your treatment and safety. Be sure to make and go to all appointments, and call your doctor if you are having problems. It's also a good idea to know your test results and keep a list of the medicines you take. How can you care for yourself at home? Set realistic goals. Many people expect to lose much more weight than is likely. A weight loss of 5% to 10% of your body weight may be enough to improve your health. Get family and friends involved to provide support. Talk to them about why you are trying to lose weight, and ask them to help. They can help by participating in exercise and having meals with you, even if they may be eating something different. Find what works best for you. If you do not have time or do not like to cook, a program that offers meal replacement bars or shakes may be better for you. Or if you like to prepare meals, finding a plan that includes daily menus and recipes may be best.  Ask your doctor about other health professionals who can help you achieve your weight loss goals.   A dietitian can help you make healthy changes in your diet. An exercise specialist or  can help you develop a safe and effective exercise program.  A counselor or psychiatrist can help you cope with issues such as depression, anxiety, or family problems that can make it hard to focus on weight loss. Consider joining a support group for people who are trying to lose weight. Your doctor can suggest groups in your area. Where can you learn more? Go to http://www.woods.com/ and enter U357 to learn more about \"Starting a Weight Loss Plan: Care Instructions. \"  Current as of: August 25, 2022               Content Version: 13.5  © 2006-2022 [employer]. Care instructions adapted under license by [facility] ([facility]). If you have questions about a medical condition or this instruction, always ask your healthcare professional. [place] [address] any warranty or liability for your use of this information. Well Visit, Women 48 to 72: Care Instructions  Overview     Well visits can help you stay healthy. Your doctor has checked your overall health and may have suggested ways to take good care of yourself. Your doctor also may have recommended tests. At home, you can help prevent illness with healthy eating, regular exercise, and other steps. Follow-up care is a key part of your treatment and safety. Be sure to make and go to all appointments, and call your doctor if you are having problems. It's also a good idea to know your test results and keep a list of the medicines you take. How can you care for yourself at home? Get screening tests that you and your doctor decide on. Screening helps find diseases before any symptoms appear. Eat healthy foods. Choose fruits, vegetables, whole grains, protein, and low-fat dairy foods. Limit fat, especially saturated fat. Reduce salt in your diet. Limit alcohol. Have no more than 1 drink a day or 7 drinks a week.   Get at least 30 minutes of exercise on most days of the week. Walking is a good choice. You also may want to do other activities, such as running, swimming, cycling, or playing tennis or team sports. Reach and stay at a healthy weight. This will lower your risk for many problems, such as obesity, diabetes, heart disease, and high blood pressure. Do not smoke. Smoking can make health problems worse. If you need help quitting, talk to your doctor about stop-smoking programs and medicines. These can increase your chances of quitting for good. Care for your mental health. It is easy to get weighed down by worry and stress. Learn strategies to manage stress, like deep breathing and mindfulness, and stay connected with your family and community. If you find you often feel sad or hopeless, talk with your doctor. Treatment can help. Talk to your doctor about whether you have any risk factors for sexually transmitted infections (STIs). You can help prevent STIs if you wait to have sex with a new partner (or partners) until you've each been tested for STIs. It also helps if you use condoms (male or female condoms) and if you limit your sex partners to one person who only has sex with you. Vaccines are available for some STIs. If you think you may have a problem with alcohol or drug use, talk to your doctor. This includes prescription medicines (such as amphetamines and opioids) and illegal drugs (such as cocaine and methamphetamine). Your doctor can help you figure out what type of treatment is best for you. Protect your skin from too much sun. When you're outdoors from 10 a.m. to 4 p.m., stay in the shade or cover up with clothing and a hat with a wide brim. Wear sunglasses that block UV rays. Even when it's cloudy, put broad-spectrum sunscreen (SPF 30 or higher) on any exposed skin. See a dentist one or two times a year for checkups and to have your teeth cleaned. Wear a seat belt in the car. When should you call for help?   Watch closely for changes in your health, and be sure to contact your doctor if you have any problems or symptoms that concern you. Where can you learn more? Go to https://chperupaleweb.healthKngine. org and sign in to your M[employer] account. Enter Y993 in the [employer] box to learn more about \"Well Visit, Women 50 to 72: Care Instructions. \"     If you do not have an account, please click on the \"Sign Up Now\" link. Current as of: June 6, 2022               Content Version: 13.4  © 0671-0018 [employer]wise[employer]orporated. Care instructions adapted under license by [facility] ([facility]). If you have questions about a medical condition or this instruction, always ask your healthcare professional. [address] any warranty or liability for your use of this information.

## 2023-02-24 NOTE — PROGRESS NOTES
Well Adult Note  Name: Mushtaq Jaramillo Date: 2023   MRN: [id number] Sex: Female   Age: 65 y.o. Ethnicity:  / Millicent Rosas   : 1965 Race:  / Johana Andrew is here for well adult exam.  History:    6 mo      Hx of \"peripancreatic surgery\"  MRI abd in  reviewed, reassuring  Hx of gastric bypass  No new concerns, denies n/v/d/c/abd pain, fevers, NS, WL  Had one episode of vomiting mo ago, resolved on its own after 24 hours      Vitamin D deficiency, taking daily supplement  UTD pap smear with PCP   S/p colonoscopy with Dr. Donna Aldrich 22, polypectomy, f/u due in 5 yrs   + HL, b/l, no pain, sometimes with tinnitus  + lesions on legs, itchy, red,  does not have anything similar     Review of Systems   Constitutional:  Negative for activity change, appetite change, chills, diaphoresis, fatigue and fever. Eyes:  Negative for visual disturbance. Respiratory:  Negative for chest tightness and shortness of breath. Cardiovascular:  Negative for chest pain, palpitations and leg swelling. Gastrointestinal:  Negative for abdominal pain, nausea and vomiting. Genitourinary:  Negative for decreased urine volume. Skin:  Positive for color change and wound. Neurological:  Negative for dizziness, weakness, light-headedness, numbness and headaches. Allergies   Allergen Reactions    Hydrocodone Nausea And Vomiting         Prior to Visit Medications    Medication Sig Taking? Authorizing Provider   triamcinolone (KENALOG) 0.1 % ointment Apply topically 2 times daily for 7 days Yes Ursula Landry MD   mupirocin (BACTROBAN) 2 % ointment Apply topically 3 times daily.  Yes Ursula Landry MD   cetirizine (ZYRTEC) 10 MG tablet Take 1 tablet by mouth daily Yes Ursula Landry MD   amoxicillin-clavulanate (AUGMENTIN) 875-125 MG per tablet Take 1 tablet by mouth 2 times daily for 7 days Yes Ursula Landry MD   fluticasone (FLONASE) 50 MCG/ACT nasal spray 2 sprays by Each Nostril route daily Yes Devin Valerio MD   Cholecalciferol (VITAMIN D3) 50 MCG ( UT) CAPS Take 1 capsule by mouth daily Yes Devin Valerio MD   calcium carbonate (OSCAL) 500 MG TABS tablet Take 500 mg by mouth daily Yes Historical Provider, MD   Ascorbic Acid (VITAMIN C) 250 MG tablet Take 250 mg by mouth daily Yes Historical Provider, MD   vitamin B-12 (CYANOCOBALAMIN) 100 MCG tablet Take 50 mcg by mouth daily Yes Historical Provider, MD   metroNIDAZOLE (METROGEL) 0.75 % gel   Historical Provider, MD         Past Medical History:   Diagnosis Date    Asthma     pt states 1 attack       Past Surgical History:   Procedure Laterality Date    ABDOMEN SURGERY N/A     , \"cancer near the pancreas\"     SECTION      x3    COLONOSCOPY      COLONOSCOPY N/A 2021    COLONOSCOPY DIAGNOSTIC performed by Dannie Olea MD at [address] [place] [address]           Family History   Problem Relation Age of Onset    High Blood Pressure Mother     Breast Cancer Mother     No Known Problems Father        Social History     Tobacco Use    Smoking status: Never    Smokeless tobacco: Never   Substance Use Topics    Alcohol use: Yes     Comment: 0-1 drinks per month    Drug use: Never       Objective   /74   Pulse 80   Temp 97.8 °F (36.6 °C)   Ht 5' 2\" (1.575 m)   Wt 200 lb (90.7 kg)   SpO2 97%   BMI 36.58 kg/m²   Wt Readings from Last 3 Encounters:   23 200 lb (90.7 kg)   22 196 lb (88.9 kg)   22 196 lb 3.2 oz (89 kg)     There were no vitals filed for this visit. Physical Exam  Vitals and nursing note reviewed. Constitutional:       General: She is not in acute distress. Appearance: She is well-developed. She is not diaphoretic. HENT:      Head: Normocephalic and atraumatic. Eyes:      Extraocular Movements: Extraocular movements intact.       Conjunctiva/sclera: Conjunctivae normal.      Pupils: Pupils are equal, round, and reactive to light. Neck:      Vascular: No JVD. Cardiovascular:      Rate and Rhythm: Normal rate and regular rhythm. Heart sounds: Normal heart sounds. No murmur heard. No friction rub. No gallop. Pulmonary:      Effort: Pulmonary effort is normal. No respiratory distress. Breath sounds: Normal breath sounds. No wheezing. Abdominal:      Palpations: Abdomen is soft. Musculoskeletal:         General: No swelling. Normal range of motion. Cervical back: Normal range of motion and neck supple. Skin:     General: Skin is warm and dry. Capillary Refill: Capillary refill takes 2 to 3 seconds. Findings: Erythema and lesion present. Neurological:      General: No focal deficit present. Mental Status: She is alert and oriented to person, place, and time. Psychiatric:         Mood and Affect: Mood normal.         Behavior: Behavior normal.         Thought Content: Thought content normal.         Judgment: Judgment normal.                   Assessment   Plan   1. Encounter for well adult exam without abnormal findings  2. Prediabetes  -     Comprehensive Metabolic Panel; Future  -     Hemoglobin A1C; Future  3. Vitamin D deficiency  -     Vitamin D 25 Hydroxy; Future  4. Elevated lipase  -     Comprehensive Metabolic Panel; Future  -     Lipase; Future  5. Mixed hyperlipidemia  -     Lipid Panel; Future  6. Screening for deficiency anemia  -     CBC with Auto Differential; Future  7. BMI 36.0-36.9,adult  -     Comprehensive Metabolic Panel; Future  -     Hemoglobin A1C; Future  -     Lipid Panel; Future  -     TSH with Reflex; Future  8. Bite  -     triamcinolone (KENALOG) 0.1 % ointment; Apply topically 2 times daily for 7 days, Topical, 2 TIMES DAILY Starting Fri 2/24/2023, Until Fri 3/3/2023, For 7 days, Disp-30 g, R-1, Normal  -     cetirizine (ZYRTEC) 10 MG tablet; Take 1 tablet by mouth daily, Disp-30 tablet, R-0Normal  9.  Cellulitis of right lower extremity  - mupirocin (BACTROBAN) 2 % ointment; Apply topically 3 times daily. , Disp-30 g, R-1, Normal  -     amoxicillin-clavulanate (AUGMENTIN) 875-125 MG per tablet; Take 1 tablet by mouth 2 times daily for 7 days, Disp-14 tablet, R-0Normal       If Lipase is elevated prior to previous, will order repeat MRI abd. Discussed with pt and agreeable     Call Monday if no improvement of LE lesions, will add Bactrim. Personalized Preventive Plan   Current Health Maintenance Status  Immunization History   Administered Date(s) Administered    Tdap (Boostrix, Adacel) 02/18/2022        Health Maintenance   Topic Date Due    COVID-19 Vaccine (1) Never done    Shingles vaccine (1 of 2) Never done    Depression Screen  05/21/2022    Flu vaccine (1) Never done    A1C test (Diabetic or Prediabetic)  02/18/2023    Breast cancer screen  09/16/2023    Cervical cancer screen  08/06/2026    Colorectal Cancer Screen  08/26/2026    Lipids  02/18/2027    DTaP/Tdap/Td vaccine (2 - Td or Tdap) 02/18/2032    Hepatitis C screen  Completed    HIV screen  Completed    Hepatitis A vaccine  Aged Out    Hib vaccine  Aged Out    Meningococcal (ACWY) vaccine  Aged Out    Pneumococcal 0-64 years Vaccine  Aged Out     Recommendations for [facility] Due: see orders and patient instructions/AVS.    Return if symptoms worsen or fail to improve.

## 2023-06-01 NOTE — TELEPHONE ENCOUNTER
Signed. Please provide list of options. Island Pedicle Flap With Canthal Suspension Text: The defect edges were debeveled with a #15 scalpel blade.  Given the location of the defect, shape of the defect and the proximity to free margins an island pedicle advancement flap was deemed most appropriate.  Using a sterile surgical marker, an appropriate advancement flap was drawn incorporating the defect, outlining the appropriate donor tissue and placing the expected incisions within the relaxed skin tension lines where possible. The area thus outlined was incised deep to adipose tissue with a #15 scalpel blade.  The skin margins were undermined to an appropriate distance in all directions around the primary defect and laterally outward around the island pedicle utilizing iris scissors.  There was minimal undermining beneath the pedicle flap. A suspension suture was placed in the canthal tendon to prevent tension and prevent ectropion.

## 2024-03-04 ENCOUNTER — OFFICE VISIT (OUTPATIENT)
Dept: INTERNAL MEDICINE CLINIC | Age: 59
End: 2024-03-04
Payer: COMMERCIAL

## 2024-03-04 VITALS
HEART RATE: 76 BPM | SYSTOLIC BLOOD PRESSURE: 110 MMHG | OXYGEN SATURATION: 97 % | BODY MASS INDEX: 36.02 KG/M2 | WEIGHT: 211 LBS | HEIGHT: 64 IN | DIASTOLIC BLOOD PRESSURE: 64 MMHG

## 2024-03-04 DIAGNOSIS — Z12.31 ENCOUNTER FOR SCREENING MAMMOGRAM FOR MALIGNANT NEOPLASM OF BREAST: ICD-10-CM

## 2024-03-04 DIAGNOSIS — R14.0 BLOATED ABDOMEN: ICD-10-CM

## 2024-03-04 DIAGNOSIS — G89.29 CHRONIC BILATERAL LOW BACK PAIN WITH LEFT-SIDED SCIATICA: ICD-10-CM

## 2024-03-04 DIAGNOSIS — M54.42 CHRONIC BILATERAL LOW BACK PAIN WITH LEFT-SIDED SCIATICA: ICD-10-CM

## 2024-03-04 DIAGNOSIS — Z00.00 ANNUAL PHYSICAL EXAM: Primary | ICD-10-CM

## 2024-03-04 PROCEDURE — 99386 PREV VISIT NEW AGE 40-64: CPT | Performed by: INTERNAL MEDICINE

## 2024-03-04 SDOH — HEALTH STABILITY: PHYSICAL HEALTH: ON AVERAGE, HOW MANY DAYS PER WEEK DO YOU ENGAGE IN MODERATE TO STRENUOUS EXERCISE (LIKE A BRISK WALK)?: 0 DAYS

## 2024-03-04 SDOH — ECONOMIC STABILITY: FOOD INSECURITY: WITHIN THE PAST 12 MONTHS, YOU WORRIED THAT YOUR FOOD WOULD RUN OUT BEFORE YOU GOT MONEY TO BUY MORE.: NEVER TRUE

## 2024-03-04 SDOH — ECONOMIC STABILITY: INCOME INSECURITY: HOW HARD IS IT FOR YOU TO PAY FOR THE VERY BASICS LIKE FOOD, HOUSING, MEDICAL CARE, AND HEATING?: NOT HARD AT ALL

## 2024-03-04 SDOH — ECONOMIC STABILITY: FOOD INSECURITY: WITHIN THE PAST 12 MONTHS, THE FOOD YOU BOUGHT JUST DIDN'T LAST AND YOU DIDN'T HAVE MONEY TO GET MORE.: NEVER TRUE

## 2024-03-04 SDOH — HEALTH STABILITY: PHYSICAL HEALTH: ON AVERAGE, HOW MANY MINUTES DO YOU ENGAGE IN EXERCISE AT THIS LEVEL?: 0 MIN

## 2024-03-04 ASSESSMENT — PATIENT HEALTH QUESTIONNAIRE - PHQ9
SUM OF ALL RESPONSES TO PHQ9 QUESTIONS 1 & 2: 0
SUM OF ALL RESPONSES TO PHQ QUESTIONS 1-9: 0
1. LITTLE INTEREST OR PLEASURE IN DOING THINGS: 0
2. FEELING DOWN, DEPRESSED OR HOPELESS: 0

## 2024-03-04 NOTE — PROGRESS NOTES
No results found for: \"TSH\", \"T4FREE\", \"T3FREE\"  Lab Results   Component Value Date    WBC 4.7 2022    HGB 13.4 2022    HCT 39.9 2022    MCV 92.7 2022     2022     No results found for: \"INR\"   No results found for: \"PSA\"   No results found for: \"LABURIC\"     Wt Readings from Last 3 Encounters:   24 95.7 kg (211 lb)   23 90.7 kg (200 lb)   22 88.9 kg (196 lb)     BP Readings from Last 3 Encounters:   24 110/64   23 118/74   22 110/70       Patient Active Problem List   Diagnosis    Pancreatic mass    Elevated lipase    Prediabetes    Vitamin D deficiency    Polyp of hepatic flexure of colon    Internal hemorrhoid       Allergies   Allergen Reactions    Hydrocodone Nausea And Vomiting     Outpatient Medications Marked as Taking for the 3/4/24 encounter (Office Visit) with Berta Washington MD   Medication Sig Dispense Refill    calcium carbonate (OSCAL) 500 MG TABS tablet Take 1 tablet by mouth daily      Ascorbic Acid (VITAMIN C) 250 MG tablet Take 1 tablet by mouth daily      vitamin B-12 (CYANOCOBALAMIN) 100 MCG tablet Take 0.5 tablets by mouth daily         Past Medical History:   Diagnosis Date    Asthma     pt states 1 attack     Past Surgical History:   Procedure Laterality Date    ABDOMEN SURGERY N/A     , \"cancer near the pancreas\"     SECTION      x3    COLONOSCOPY      COLONOSCOPY N/A 2021    COLONOSCOPY DIAGNOSTIC performed by Vasile Cervantes MD at [facility] ENDOSCOPY    DILATION AND CURETTAGE OF UTERUS       Family History   Problem Relation Age of Onset    High Blood Pressure Mother     Breast Cancer Mother     Asthma Mother     No Known Problems Father     Cancer Paternal Grandfather         throat cancer    Diabetes Maternal Aunt      Social History     Socioeconomic History    Marital status:      Spouse name: Not on file    Number of children: Not on file    Years of education: Not on file    Highest

## 2024-04-16 ENCOUNTER — HOSPITAL ENCOUNTER (OUTPATIENT)
Dept: WOMENS IMAGING | Age: 59
Discharge: HOME | End: 2024-04-16
Payer: COMMERCIAL

## 2024-04-16 VITALS — BODY MASS INDEX: 35.34 KG/M2 | HEIGHT: 64 IN | WEIGHT: 207 LBS

## 2024-04-16 DIAGNOSIS — Z12.31 ENCOUNTER FOR SCREENING MAMMOGRAM FOR MALIGNANT NEOPLASM OF BREAST: ICD-10-CM

## 2024-04-16 PROCEDURE — 77063 BREAST TOMOSYNTHESIS BI: CPT

## 2024-05-21 DIAGNOSIS — Z00.00 ANNUAL PHYSICAL EXAM: ICD-10-CM

## 2024-05-21 LAB
ALBUMIN SERPL-MCNC: 4.2 G/DL (ref 3.4–5)
ALBUMIN/GLOB SERPL: 1.9 {RATIO} (ref 1.1–2.2)
ALP SERPL-CCNC: 87 U/L (ref 40–129)
ALT SERPL-CCNC: 20 U/L (ref 10–40)
ANION GAP SERPL CALCULATED.3IONS-SCNC: 15 MMOL/L (ref 3–16)
AST SERPL-CCNC: 14 U/L (ref 15–37)
BASOPHILS # BLD: 0 K/UL (ref 0–0.2)
BASOPHILS NFR BLD: 0.9 %
BILIRUB SERPL-MCNC: <0.2 MG/DL (ref 0–1)
BUN SERPL-MCNC: 20 MG/DL (ref 7–20)
CALCIUM SERPL-MCNC: 8.2 MG/DL (ref 8.3–10.6)
CHLORIDE SERPL-SCNC: 103 MMOL/L (ref 99–110)
CHOLEST SERPL-MCNC: 198 MG/DL (ref 0–199)
CO2 SERPL-SCNC: 29 MMOL/L (ref 21–32)
CREAT SERPL-MCNC: 0.6 MG/DL (ref 0.6–1.1)
DEPRECATED RDW RBC AUTO: 14.9 % (ref 12.4–15.4)
EOSINOPHIL # BLD: 0.4 K/UL (ref 0–0.6)
EOSINOPHIL NFR BLD: 7.3 %
GFR SERPLBLD CREATININE-BSD FMLA CKD-EPI: >90 ML/MIN/{1.73_M2}
GLUCOSE SERPL-MCNC: 83 MG/DL (ref 70–99)
HCT VFR BLD AUTO: 38.7 % (ref 36–48)
HDLC SERPL-MCNC: 61 MG/DL (ref 40–60)
HGB BLD-MCNC: 13 G/DL (ref 12–16)
LDLC SERPL CALC-MCNC: 120 MG/DL
LYMPHOCYTES # BLD: 1.9 K/UL (ref 1–5.1)
LYMPHOCYTES NFR BLD: 38.5 %
MCH RBC QN AUTO: 31.1 PG (ref 26–34)
MCHC RBC AUTO-ENTMCNC: 33.6 G/DL (ref 31–36)
MCV RBC AUTO: 92.3 FL (ref 80–100)
MONOCYTES # BLD: 0.5 K/UL (ref 0–1.3)
MONOCYTES NFR BLD: 9.5 %
NEUTROPHILS # BLD: 2.2 K/UL (ref 1.7–7.7)
NEUTROPHILS NFR BLD: 43.8 %
PLATELET # BLD AUTO: 335 K/UL (ref 135–450)
PMV BLD AUTO: 7.9 FL (ref 5–10.5)
POTASSIUM SERPL-SCNC: 4.3 MMOL/L (ref 3.5–5.1)
PROT SERPL-MCNC: 6.4 G/DL (ref 6.4–8.2)
RBC # BLD AUTO: 4.2 M/UL (ref 4–5.2)
SODIUM SERPL-SCNC: 147 MMOL/L (ref 136–145)
TRIGL SERPL-MCNC: 84 MG/DL (ref 0–150)
VLDLC SERPL CALC-MCNC: 17 MG/DL
WBC # BLD AUTO: 5 K/UL (ref 4–11)

## 2024-08-30 ENCOUNTER — OFFICE VISIT (OUTPATIENT)
Dept: OBGYN CLINIC | Age: 59
End: 2024-08-30
Payer: COMMERCIAL

## 2024-08-30 VITALS
HEART RATE: 59 BPM | SYSTOLIC BLOOD PRESSURE: 124 MMHG | DIASTOLIC BLOOD PRESSURE: 70 MMHG | WEIGHT: 214.2 LBS | BODY MASS INDEX: 36.57 KG/M2 | HEIGHT: 64 IN

## 2024-08-30 DIAGNOSIS — Z12.4 PAP SMEAR FOR CERVICAL CANCER SCREENING: ICD-10-CM

## 2024-08-30 DIAGNOSIS — N94.10 DYSPAREUNIA IN FEMALE: ICD-10-CM

## 2024-08-30 DIAGNOSIS — Z01.419 ENCNTR FOR GYN EXAM (GENERAL) (ROUTINE) W/O ABN FINDINGS: Primary | ICD-10-CM

## 2024-08-30 DIAGNOSIS — R23.2 HOT FLASHES: ICD-10-CM

## 2024-08-30 DIAGNOSIS — N95.2 VAGINAL ATROPHY: ICD-10-CM

## 2024-08-30 PROCEDURE — 99386 PREV VISIT NEW AGE 40-64: CPT | Performed by: OBSTETRICS & GYNECOLOGY

## 2024-08-30 RX ORDER — ESTRADIOL 0.1 MG/G
1 CREAM VAGINAL
Qty: 42.5 G | Refills: 3 | Status: SHIPPED | OUTPATIENT
Start: 2024-08-30

## 2024-09-04 LAB
HPV HR 12 DNA SPEC QL NAA+PROBE: NOT DETECTED
HPV16 DNA SPEC QL NAA+PROBE: NOT DETECTED
HPV16+18+H RISK 12 DNA SPEC-IMP: NORMAL
HPV18 DNA SPEC QL NAA+PROBE: NOT DETECTED

## 2025-01-14 ENCOUNTER — OFFICE VISIT (OUTPATIENT)
Age: 60
End: 2025-01-14

## 2025-01-14 VITALS
OXYGEN SATURATION: 96 % | DIASTOLIC BLOOD PRESSURE: 76 MMHG | HEART RATE: 75 BPM | TEMPERATURE: 97.4 F | SYSTOLIC BLOOD PRESSURE: 120 MMHG

## 2025-01-14 DIAGNOSIS — J40 BRONCHITIS: Primary | ICD-10-CM

## 2025-01-14 RX ORDER — DOXYCYCLINE HYCLATE 100 MG
100 TABLET ORAL 2 TIMES DAILY
Qty: 20 TABLET | Refills: 0 | Status: SHIPPED | OUTPATIENT
Start: 2025-01-14 | End: 2025-01-24

## 2025-01-14 RX ORDER — PREDNISONE 10 MG/1
10 TABLET ORAL 2 TIMES DAILY
Qty: 10 TABLET | Refills: 0 | Status: SHIPPED | OUTPATIENT
Start: 2025-01-14 | End: 2025-01-19

## 2025-01-14 RX ORDER — ALBUTEROL SULFATE 90 UG/1
2 INHALANT RESPIRATORY (INHALATION) 4 TIMES DAILY PRN
Qty: 18 G | Refills: 0 | Status: SHIPPED | OUTPATIENT
Start: 2025-01-14

## 2025-01-14 ASSESSMENT — ENCOUNTER SYMPTOMS
SINUS PRESSURE: 0
CHEST TIGHTNESS: 0
SORE THROAT: 0
SHORTNESS OF BREATH: 1
ABDOMINAL PAIN: 0
VOMITING: 0
NAUSEA: 0
COUGH: 1

## 2025-01-14 NOTE — PATIENT INSTRUCTIONS
Take medication as prescribed.   Rest and Increase fluids.    Chest Xray script given. We will have xray tomorrow in clinic.       Let your PCP know of your Urgent Care visit and follow up with them if symptoms are not improving.  If any worsening of symptoms go to ER for further workup and assessment.     The patient tolerated their visit well. The patient and/or the family were informed of the results of any tests, a time was given to answer questions, a plan was proposed and they agreed with plan. Reviewed AVS with treatment instructions and answered questions - pt/family expresses understanding and agreement with the discussed treatment plan and AVS instructions.

## 2025-01-14 NOTE — PROGRESS NOTES
Fernanda Liu (:  1965) is a 59 y.o. female,New patient, here for evaluation of the following chief complaint(s):  Cough (Started Thursday, has had since last year, comes and goes sometimes coughs so hard causes chest pain.), Headache, and Dizziness (Started this morning )      ASSESSMENT/PLAN:    ICD-10-CM    1. Bronchitis  J40 doxycycline hyclate (VIBRA-TABS) 100 MG tablet     predniSONE (DELTASONE) 10 MG tablet     albuterol sulfate HFA (VENTOLIN HFA) 108 (90 Base) MCG/ACT inhaler     XR CHEST (2 VW)          Off/On cough for the last 1 year-chest Xray script given.  Already prescribed medication for bronchitis. If this cough does not improve next step is to f/u with Lung specialist (pulmonology)     SUBJECTIVE/OBJECTIVE:    History provided by:  Patient   used: No      HPI:  USED.  59 y.o. female presents with symptoms of cough, some Shortness of breath with cough, congestion ongoing since the last 1 week. Does have asthma hx. Denies fever, sinus pain, n/v. Has taken otc meds for symptoms so far.     Vitals:    25 1414   BP: 120/76   Site: Left Upper Arm   Position: Sitting   Cuff Size: Large Adult   Pulse: 75   Temp: 97.4 °F (36.3 °C)   TempSrc: Temporal   SpO2: 96%       Review of Systems   Constitutional:  Negative for chills, fatigue and fever.   HENT:  Positive for congestion. Negative for ear pain, sinus pressure and sore throat.    Respiratory:  Positive for cough and shortness of breath. Negative for chest tightness.    Cardiovascular:  Negative for chest pain.   Gastrointestinal:  Negative for abdominal pain, nausea and vomiting.   Musculoskeletal:  Negative for myalgias.   Skin:  Negative for rash.   Neurological:  Positive for headaches.       Physical Exam  Constitutional:       General: She is not in acute distress.     Appearance: Normal appearance.   HENT:      Right Ear: Tympanic membrane normal.      Left Ear: Tympanic membrane normal.

## 2025-01-16 ENCOUNTER — TELEPHONE (OUTPATIENT)
Dept: INTERNAL MEDICINE CLINIC | Age: 60
End: 2025-01-16

## 2025-01-20 ENCOUNTER — OFFICE VISIT (OUTPATIENT)
Dept: OBGYN CLINIC | Age: 60
End: 2025-01-20
Payer: COMMERCIAL

## 2025-01-20 VITALS
SYSTOLIC BLOOD PRESSURE: 130 MMHG | WEIGHT: 216 LBS | HEART RATE: 102 BPM | BODY MASS INDEX: 37.08 KG/M2 | DIASTOLIC BLOOD PRESSURE: 80 MMHG

## 2025-01-20 DIAGNOSIS — N95.2 VAGINAL ATROPHY: Primary | ICD-10-CM

## 2025-01-20 PROCEDURE — 99213 OFFICE O/P EST LOW 20 MIN: CPT | Performed by: OBSTETRICS & GYNECOLOGY

## 2025-01-20 RX ORDER — ESTRADIOL 0.1 MG/G
1 CREAM VAGINAL
Qty: 42.5 G | Refills: 3 | Status: SHIPPED | OUTPATIENT
Start: 2025-01-20

## 2025-01-20 NOTE — PROGRESS NOTES
Return Gyn Office Visit    CC:   Chief Complaint   Patient presents with    Follow-up       HPI:  Fernanda Liu is a 59 y.o. female who presents for medication check after starting vaginal estrogen.     Patient is seen and examined today.   Doing well today without complaints.     Patient with a history of painful intercourse due to atrophy.  Was started on Vaginal estrogen.  Is doing well with such and has noticed an improvement in discomfort with intercourse.  Denies vaginal itching, discharge or irritation.  Denies chest pain, shortness of breath, fever, chills, nausea, vomiting.     Managing hot flashes expectantly and feels she is doing well.     Review of Systems - The following ROS was otherwise negative, except as noted in the HPI: constitutional, respiratory, cardiovascular, gastrointestinal, genitourinary    Objective:  /80 (Site: Left Upper Arm, Position: Sitting, Cuff Size: Medium Adult)   Pulse (!) 102   Wt 98 kg (216 lb)   BMI 37.08 kg/m²     Physical Exam  Vitals reviewed.   Constitutional:       General: She is not in acute distress.     Appearance: She is well-developed.   HENT:      Head: Normocephalic and atraumatic.   Eyes:      Conjunctiva/sclera: Conjunctivae normal.   Cardiovascular:      Rate and Rhythm: Normal rate.   Pulmonary:      Effort: Pulmonary effort is normal. No respiratory distress.   Abdominal:      General: There is no distension.      Palpations: Abdomen is soft.      Tenderness: There is no abdominal tenderness. There is no guarding or rebound.   Musculoskeletal:         General: No swelling.   Skin:     General: Skin is warm and dry.   Neurological:      Mental Status: She is alert and oriented to person, place, and time.   Psychiatric:         Mood and Affect: Mood normal.         Behavior: Behavior normal.         Thought Content: Thought content normal.         Assessment/Plan:     Fernanda Liu is a 59 y.o. female who presents for medication check

## 2025-01-28 ASSESSMENT — PATIENT HEALTH QUESTIONNAIRE - PHQ9
SUM OF ALL RESPONSES TO PHQ QUESTIONS 1-9: 0
SUM OF ALL RESPONSES TO PHQ9 QUESTIONS 1 & 2: 0
SUM OF ALL RESPONSES TO PHQ QUESTIONS 1-9: 0
1. LITTLE INTEREST OR PLEASURE IN DOING THINGS: NOT AT ALL
SUM OF ALL RESPONSES TO PHQ9 QUESTIONS 1 & 2: 0
SUM OF ALL RESPONSES TO PHQ QUESTIONS 1-9: 0
1. LITTLE INTEREST OR PLEASURE IN DOING THINGS: NOT AT ALL
2. FEELING DOWN, DEPRESSED OR HOPELESS: NOT AT ALL
2. FEELING DOWN, DEPRESSED OR HOPELESS: NOT AT ALL
SUM OF ALL RESPONSES TO PHQ QUESTIONS 1-9: 0

## 2025-01-29 ENCOUNTER — OFFICE VISIT (OUTPATIENT)
Dept: INTERNAL MEDICINE CLINIC | Age: 60
End: 2025-01-29
Payer: COMMERCIAL

## 2025-01-29 VITALS
HEIGHT: 64 IN | HEART RATE: 74 BPM | OXYGEN SATURATION: 98 % | WEIGHT: 212 LBS | SYSTOLIC BLOOD PRESSURE: 94 MMHG | DIASTOLIC BLOOD PRESSURE: 68 MMHG | BODY MASS INDEX: 36.19 KG/M2

## 2025-01-29 DIAGNOSIS — Z23 NEED FOR PROPHYLACTIC VACCINATION AGAINST HEPATITIS B VIRUS: ICD-10-CM

## 2025-01-29 DIAGNOSIS — Z23 NEED FOR SHINGLES VACCINE: ICD-10-CM

## 2025-01-29 DIAGNOSIS — Z00.00 ENCOUNTER FOR WELL ADULT EXAM WITHOUT ABNORMAL FINDINGS: Primary | ICD-10-CM

## 2025-01-29 PROCEDURE — 90471 IMMUNIZATION ADMIN: CPT | Performed by: INTERNAL MEDICINE

## 2025-01-29 PROCEDURE — 99396 PREV VISIT EST AGE 40-64: CPT | Performed by: INTERNAL MEDICINE

## 2025-01-29 PROCEDURE — 90739 HEPB VACC 2/4 DOSE ADULT IM: CPT | Performed by: INTERNAL MEDICINE

## 2025-01-29 SDOH — ECONOMIC STABILITY: FOOD INSECURITY: WITHIN THE PAST 12 MONTHS, THE FOOD YOU BOUGHT JUST DIDN'T LAST AND YOU DIDN'T HAVE MONEY TO GET MORE.: NEVER TRUE

## 2025-01-29 SDOH — ECONOMIC STABILITY: FOOD INSECURITY: WITHIN THE PAST 12 MONTHS, YOU WORRIED THAT YOUR FOOD WOULD RUN OUT BEFORE YOU GOT MONEY TO BUY MORE.: NEVER TRUE

## 2025-01-29 NOTE — PATIENT INSTRUCTIONS
checked your overall health and may have suggested ways to take good care of yourself. Your doctor also may have recommended tests. You can help prevent illness with healthy eating, good sleep, vaccinations, regular exercise, and other steps.    Get the tests that you and your doctor decide on. Depending on your age and risks, examples might include screening for diabetes; hepatitis C; HIV; and cervical, breast, lung, and colon cancer. Screening helps find diseases before any symptoms appear.   Eat healthy foods. Choose fruits, vegetables, whole grains, lean protein, and low-fat dairy foods. Limit saturated fat and reduce salt.     Limit alcohol. Men should have no more than 2 drinks a day. Women should have no more than 1. For some people, no alcohol is the best choice.   Exercise. Get at least 30 minutes of exercise on most days of the week. Walking can be a good choice.     Reach and stay at your healthy weight. This will lower your risk for many health problems.   Take care of your mental health. Try to stay connected with friends, family, and community, and find ways to manage stress.     If you're feeling depressed or hopeless, talk to someone. A counselor can help. If you don't have a counselor, talk to your doctor.   Talk to your doctor if you think you may have a problem with alcohol or drug use. This includes prescription medicines, marijuana, and other drugs.     Avoid tobacco and nicotine: Don't smoke, vape, or chew. If you need help quitting, talk to your doctor.   Practice safer sex. Getting tested, using condoms or dental dams, and limiting sex partners can help prevent STIs.     Use birth control if it's important to you to prevent pregnancy. Talk with your doctor about your choices and what might be best for you.   Prevent problems where you can. Protect your skin from too much sun, wash your hands, brush your teeth twice a day, and wear a seat belt in the car.   Where can you learn more?  Go to

## 2025-01-29 NOTE — PROGRESS NOTES
[facility]  History and Physical  Berta Washington M.D.        Fernanda Liu  YOB: 1965    Date of Service:  1/29/2025    Chief Complaint:   Fernanda Liu is a 59 y.o. female who presents for   Chief Complaint   Patient presents with    Annual Exam     Fasting   # [id number] [place]       Assessment/Plan:    Fernanda was seen today for annual exam.    Diagnoses and all orders for this visit:    Encounter for well adult exam without abnormal findings  -     Lipid Panel; Future  -     Comprehensive Metabolic Panel; Future  -     CBC with Auto Differential; Future    Need for prophylactic vaccination against hepatitis B virus  -     Hep B, HEPLISAV-B, (18 yrs+), IM, 0.5mL, 2-DOSES - Clinic Administered  -     Hep B, HEPLISAV-B, (age 18 yrs+), IM, 0.5mL, 2-dose; Future    Need for shingles vaccine  -     Zoster, SHINGRIX, (18 yrs +), IM; Future      Return Fasting lab, Hep B#2, Shingrix #1 3/12, for Fasting PE 1/28.      HPI: Here for Annual Physical and Follow up.      Lab Results   Component Value Date    LABA1C 5.9 02/18/2022    LABA1C 5.7 07/30/2021     Lab Results   Component Value Date     (H) 05/21/2024    K 4.3 05/21/2024     05/21/2024    CO2 29 05/21/2024    BUN 20 05/21/2024    CREATININE 0.6 05/21/2024    GLUCOSE 83 05/21/2024    CALCIUM 8.2 (L) 05/21/2024     Lab Results   Component Value Date/Time    CHOL 198 05/21/2024 07:47 AM    TRIG 84 05/21/2024 07:47 AM    HDL 61 05/21/2024 07:47 AM     Lab Results   Component Value Date    ALT 20 05/21/2024    AST 14 (L) 05/21/2024     Lab Results   Component Value Date    TSH 1.21 07/30/2021     Lab Results   Component Value Date    WBC 5.0 05/21/2024    HGB 13.0 05/21/2024    HCT 38.7 05/21/2024    MCV 92.3 05/21/2024     05/21/2024     No results found for: \"INR\"   No results found for: \"PSA\"   No results found for: \"LABURIC\"     Wt Readings from Last 3 Encounters:   01/29/25 96.2 kg (212 lb)

## 2025-03-12 ENCOUNTER — NURSE ONLY (OUTPATIENT)
Dept: INTERNAL MEDICINE CLINIC | Age: 60
End: 2025-03-12
Payer: COMMERCIAL

## 2025-03-12 DIAGNOSIS — Z23 NEED FOR PROPHYLACTIC VACCINATION AGAINST HEPATITIS B VIRUS: ICD-10-CM

## 2025-03-12 DIAGNOSIS — Z23 NEED FOR SHINGLES VACCINE: ICD-10-CM

## 2025-03-12 DIAGNOSIS — Z00.00 ENCOUNTER FOR WELL ADULT EXAM WITHOUT ABNORMAL FINDINGS: ICD-10-CM

## 2025-03-12 PROCEDURE — 90472 IMMUNIZATION ADMIN EACH ADD: CPT | Performed by: INTERNAL MEDICINE

## 2025-03-12 PROCEDURE — 36415 COLL VENOUS BLD VENIPUNCTURE: CPT | Performed by: INTERNAL MEDICINE

## 2025-03-12 PROCEDURE — 90750 HZV VACC RECOMBINANT IM: CPT | Performed by: INTERNAL MEDICINE

## 2025-03-12 PROCEDURE — 90739 HEPB VACC 2/4 DOSE ADULT IM: CPT | Performed by: INTERNAL MEDICINE

## 2025-03-12 PROCEDURE — 90471 IMMUNIZATION ADMIN: CPT | Performed by: INTERNAL MEDICINE

## 2025-03-13 ENCOUNTER — RESULTS FOLLOW-UP (OUTPATIENT)
Dept: INTERNAL MEDICINE CLINIC | Age: 60
End: 2025-03-13

## 2025-03-13 LAB
ALBUMIN SERPL-MCNC: 4.3 G/DL (ref 3.4–5)
ALBUMIN/GLOB SERPL: 1.7 {RATIO} (ref 1.1–2.2)
ALP SERPL-CCNC: 86 U/L (ref 40–129)
ALT SERPL-CCNC: 24 U/L (ref 10–40)
ANION GAP SERPL CALCULATED.3IONS-SCNC: 8 MMOL/L (ref 3–16)
AST SERPL-CCNC: 23 U/L (ref 15–37)
BASOPHILS # BLD: 0.1 K/UL (ref 0–0.2)
BASOPHILS NFR BLD: 2.1 %
BILIRUB SERPL-MCNC: 0.3 MG/DL (ref 0–1)
BUN SERPL-MCNC: 19 MG/DL (ref 7–20)
CALCIUM SERPL-MCNC: 9.6 MG/DL (ref 8.3–10.6)
CHLORIDE SERPL-SCNC: 106 MMOL/L (ref 99–110)
CHOLEST SERPL-MCNC: 218 MG/DL (ref 0–199)
CO2 SERPL-SCNC: 28 MMOL/L (ref 21–32)
CREAT SERPL-MCNC: 0.8 MG/DL (ref 0.6–1.1)
DEPRECATED RDW RBC AUTO: 15.6 % (ref 12.4–15.4)
EOSINOPHIL # BLD: 0.4 K/UL (ref 0–0.6)
EOSINOPHIL NFR BLD: 7.5 %
GFR SERPLBLD CREATININE-BSD FMLA CKD-EPI: 85 ML/MIN/{1.73_M2}
GLUCOSE SERPL-MCNC: 104 MG/DL (ref 70–99)
HCT VFR BLD AUTO: 42.8 % (ref 36–48)
HDLC SERPL-MCNC: 67 MG/DL (ref 40–60)
HGB BLD-MCNC: 14.2 G/DL (ref 12–16)
LDLC SERPL CALC-MCNC: 137 MG/DL
LYMPHOCYTES # BLD: 1.6 K/UL (ref 1–5.1)
LYMPHOCYTES NFR BLD: 32.6 %
MCH RBC QN AUTO: 31.2 PG (ref 26–34)
MCHC RBC AUTO-ENTMCNC: 33.1 G/DL (ref 31–36)
MCV RBC AUTO: 94.1 FL (ref 80–100)
MONOCYTES # BLD: 0.3 K/UL (ref 0–1.3)
MONOCYTES NFR BLD: 6.9 %
NEUTROPHILS # BLD: 2.5 K/UL (ref 1.7–7.7)
NEUTROPHILS NFR BLD: 50.9 %
PLATELET # BLD AUTO: 343 K/UL (ref 135–450)
PMV BLD AUTO: 8.2 FL (ref 5–10.5)
POTASSIUM SERPL-SCNC: 5.6 MMOL/L (ref 3.5–5.1)
PROT SERPL-MCNC: 6.9 G/DL (ref 6.4–8.2)
RBC # BLD AUTO: 4.55 M/UL (ref 4–5.2)
SODIUM SERPL-SCNC: 142 MMOL/L (ref 136–145)
TRIGL SERPL-MCNC: 71 MG/DL (ref 0–150)
VLDLC SERPL CALC-MCNC: 14 MG/DL
WBC # BLD AUTO: 5 K/UL (ref 4–11)

## 2025-05-08 ENCOUNTER — HOSPITAL ENCOUNTER (OUTPATIENT)
Dept: WOMENS IMAGING | Age: 60
Discharge: HOME | End: 2025-05-08
Payer: COMMERCIAL

## 2025-05-08 VITALS — WEIGHT: 208 LBS | BODY MASS INDEX: 35.51 KG/M2 | HEIGHT: 64 IN

## 2025-05-08 DIAGNOSIS — Z12.31 ENCOUNTER FOR SCREENING MAMMOGRAM FOR MALIGNANT NEOPLASM OF BREAST: ICD-10-CM

## 2025-05-08 PROCEDURE — 77063 BREAST TOMOSYNTHESIS BI: CPT

## 2025-07-23 SDOH — HEALTH STABILITY: PHYSICAL HEALTH: ON AVERAGE, HOW MANY MINUTES DO YOU ENGAGE IN EXERCISE AT THIS LEVEL?: 0 MIN

## 2025-07-23 SDOH — HEALTH STABILITY: PHYSICAL HEALTH: ON AVERAGE, HOW MANY DAYS PER WEEK DO YOU ENGAGE IN MODERATE TO STRENUOUS EXERCISE (LIKE A BRISK WALK)?: 0 DAYS

## 2025-07-25 ENCOUNTER — OFFICE VISIT (OUTPATIENT)
Dept: FAMILY MEDICINE CLINIC | Age: 60
End: 2025-07-25
Payer: COMMERCIAL

## 2025-07-25 VITALS
HEART RATE: 64 BPM | BODY MASS INDEX: 36.54 KG/M2 | WEIGHT: 214 LBS | DIASTOLIC BLOOD PRESSURE: 70 MMHG | HEIGHT: 64 IN | OXYGEN SATURATION: 96 % | SYSTOLIC BLOOD PRESSURE: 118 MMHG

## 2025-07-25 DIAGNOSIS — Z76.89 ENCOUNTER TO ESTABLISH CARE: Primary | ICD-10-CM

## 2025-07-25 DIAGNOSIS — M54.16 LUMBAR RADICULAR PAIN: ICD-10-CM

## 2025-07-25 DIAGNOSIS — R20.2 PARESTHESIA OF LEFT LEG: ICD-10-CM

## 2025-07-25 PROCEDURE — 99214 OFFICE O/P EST MOD 30 MIN: CPT | Performed by: STUDENT IN AN ORGANIZED HEALTH CARE EDUCATION/TRAINING PROGRAM

## 2025-07-25 ASSESSMENT — ENCOUNTER SYMPTOMS
COUGH: 0
DIARRHEA: 0
SHORTNESS OF BREATH: 0
CONSTIPATION: 0

## 2025-07-25 NOTE — CONSULTS
Session ID: [id number]  Session Duration: Longer than 52 minutes  Language: Sami   ID: #[id number]   Name: Keny

## 2025-07-25 NOTE — PROGRESS NOTES
[facility]  [address],  [place] [address]    Assessment/Plan:    Fernanda was seen today for establish care.    Diagnoses and all orders for this visit:    Encounter to establish care    Lumbar radicular pain  -     MRI LUMBAR SPINE WO CONTRAST; Future    Paresthesia of left leg  -     TSH reflex to FT4; Future  -     Vitamin B12 & Folate; Future  -     Basic Metabolic Panel; Future      Assessment & Plan  1. Numbness in the left leg.  - Reports experiencing numbness in the left leg for the past 3 to 4 years, sometimes feeling hot. The numbness is not associated with pain and occurs both day and night.  - Physical examination reveals no pain in the leg.  - Blood work will be ordered to check for vitamin deficiencies, thyroid function, and electrolyte levels, including potassium, which was previously elevated.  - An MRI of the back will be conducted to rule out any potential causes for the numbness and tingling.    2. Back pain.  - Reports lower back pain that has been persistent and worsening over the past 7 years, rated as an 8/10 in severity. The pain does not radiate down the leg.  - Physical examination reveals pain upon palpation of the lower back and difficulty in movement.  - Discussed the potential causes of back pain and the need for further investigation.  - An MRI of the back will be conducted to investigate the cause of the pain.    3. Congestion and cough.  - Reports having congestion and a cough with phlegm since last Saturday, which has shown some improvement.  - No specific physical examination findings discussed.  - Reviewed the recent history of symptoms and improvement.  - No specific treatment or medication prescribed for the congestion and cough at this time.    4. Health maintenance.  - Received the shingles vaccine from Dr. Serna and the second dose at a pharmacy.  - Has not seen any other doctors besides Dr. Serna recently.  - Last saw a gynecologist

## 2025-07-26 LAB
ANION GAP SERPL CALCULATED.3IONS-SCNC: 12 MMOL/L (ref 3–16)
BUN SERPL-MCNC: 19 MG/DL (ref 7–20)
CALCIUM SERPL-MCNC: 9.4 MG/DL (ref 8.3–10.6)
CHLORIDE SERPL-SCNC: 105 MMOL/L (ref 99–110)
CO2 SERPL-SCNC: 27 MMOL/L (ref 21–32)
CREAT SERPL-MCNC: 0.6 MG/DL (ref 0.6–1.1)
FOLATE SERPL-MCNC: 10.6 NG/ML (ref 4.78–24.2)
GFR SERPLBLD CREATININE-BSD FMLA CKD-EPI: >90 ML/MIN/{1.73_M2}
GLUCOSE SERPL-MCNC: 86 MG/DL (ref 70–99)
POTASSIUM SERPL-SCNC: 4.6 MMOL/L (ref 3.5–5.1)
SODIUM SERPL-SCNC: 144 MMOL/L (ref 136–145)
TSH SERPL DL<=0.005 MIU/L-ACNC: 1.01 UIU/ML (ref 0.27–4.2)
VIT B12 SERPL-MCNC: 876 PG/ML (ref 211–911)

## (undated) DEVICE — ENDOSCOPIC KIT 2 12 FT OP4 DE2 GWN SYR

## (undated) DEVICE — CANNULA NSL AD TBNG L7FT PVC STR NONFLARED PRNG O2 DEL W STD

## (undated) DEVICE — ELECTRODE,ECG,STRESS,FOAM,3PK: Brand: MEDLINE

## (undated) DEVICE — SNARE ENDOSCP POLYP 2.4 MM 240 CM 10 MM 2.8 MM CAPTIVATOR

## (undated) DEVICE — TRAP SPEC POLYPR SGL CHMBR FN MESH SCRN